# Patient Record
Sex: FEMALE | Race: WHITE
[De-identification: names, ages, dates, MRNs, and addresses within clinical notes are randomized per-mention and may not be internally consistent; named-entity substitution may affect disease eponyms.]

---

## 2018-10-09 LAB
ANION GAP SERPL CALC-SCNC: 14.2 MMOL/L (ref 8–16)
BASOPHILS # BLD AUTO: 0.1 10*3/UL (ref 0–0.1)
BASOPHILS NFR BLD AUTO: 0.7 % (ref 0–1)
BUN SERPL-MCNC: 23 MG/DL (ref 7–26)
BUN/CREAT SERPL: 26 (ref 6–25)
CALCIUM SERPL-MCNC: 9.6 MG/DL (ref 8.4–10.2)
CHLORIDE SERPL-SCNC: 104 MMOL/L (ref 98–107)
CO2 SERPL-SCNC: 27 MMOL/L (ref 22–29)
DEPRECATED NEUTROPHILS # BLD AUTO: 4.6 10*3/UL (ref 2.1–6.9)
EGFRCR SERPLBLD CKD-EPI 2021: > 60 ML/MIN (ref 60–?)
EOSINOPHIL # BLD AUTO: 0.1 10*3/UL (ref 0–0.4)
EOSINOPHIL NFR BLD AUTO: 2 % (ref 0–6)
ERYTHROCYTE [DISTWIDTH] IN CORD BLOOD: 13 % (ref 11.7–14.4)
GLUCOSE SERPLBLD-MCNC: 95 MG/DL (ref 74–118)
HCT VFR BLD AUTO: 40 % (ref 34.2–44.1)
HGB BLD-MCNC: 12.9 G/DL (ref 12–16)
LYMPHOCYTES # BLD: 1.6 10*3/UL (ref 1–3.2)
LYMPHOCYTES NFR BLD AUTO: 23 % (ref 18–39.1)
MCH RBC QN AUTO: 31.9 PG (ref 28–32)
MCHC RBC AUTO-ENTMCNC: 32.3 G/DL (ref 31–35)
MCV RBC AUTO: 99 FL (ref 81–99)
MONOCYTES # BLD AUTO: 0.6 10*3/UL (ref 0.2–0.8)
MONOCYTES NFR BLD AUTO: 8.3 % (ref 4.4–11.3)
NEUTS SEG NFR BLD AUTO: 65.6 % (ref 38.7–80)
PLATELET # BLD AUTO: 187 X10E3/UL (ref 140–360)
POTASSIUM SERPL-SCNC: 4.2 MMOL/L (ref 3.5–5.1)
RBC # BLD AUTO: 4.04 X10E6/UL (ref 3.6–5.1)
SODIUM SERPL-SCNC: 141 MMOL/L (ref 136–145)

## 2018-10-09 NOTE — DIAGNOSTIC IMAGING REPORT
EXAMINATION:  CHEST 2 VIEWS    



INDICATION:      

\S\PER PROTOCOL

\S\PRE ADMIT    



COMPARISON:  None

     

FINDINGS:  PA and lateral views



TUBES and LINES:  None.



LUNGS:  Lungs are well inflated.  Lungs are clear.   There is no evidence of

pneumonia or pulmonary edema.



PLEURA:  No pleural effusion or pneumothorax.



HEART AND MEDIASTINUM:  The cardiomediastinal silhouette is unremarkable. Mild

atherosclerotic calcifications of the aortic arch.



BONES AND SOFT TISSUES:  Cervical spine fusion. Metallic hardware overlying the

right humeral head. Mild degenerative changes of the thoracic spine.  Surgical

clips overlying the upper abdomen on lateral view.



UPPER ABDOMEN: No free air under the diaphragm. 



IMPRESSION: 

No acute thoracic abnormality.





Signed by: Dr. Celine Roberson M.D. on 10/9/2018 4:15 PM

## 2018-10-10 ENCOUNTER — HOSPITAL ENCOUNTER (OUTPATIENT)
Dept: HOSPITAL 88 - OR | Age: 78
Discharge: HOME | End: 2018-10-10
Attending: UROLOGY
Payer: MEDICARE

## 2018-10-10 VITALS — DIASTOLIC BLOOD PRESSURE: 72 MMHG | SYSTOLIC BLOOD PRESSURE: 122 MMHG

## 2018-10-10 DIAGNOSIS — N18.9: ICD-10-CM

## 2018-10-10 DIAGNOSIS — Z87.442: ICD-10-CM

## 2018-10-10 DIAGNOSIS — R35.1: ICD-10-CM

## 2018-10-10 DIAGNOSIS — N31.9: ICD-10-CM

## 2018-10-10 DIAGNOSIS — Z79.82: ICD-10-CM

## 2018-10-10 DIAGNOSIS — I12.9: ICD-10-CM

## 2018-10-10 DIAGNOSIS — N81.10: ICD-10-CM

## 2018-10-10 DIAGNOSIS — K21.9: ICD-10-CM

## 2018-10-10 DIAGNOSIS — Z01.818: ICD-10-CM

## 2018-10-10 DIAGNOSIS — N30.10: ICD-10-CM

## 2018-10-10 DIAGNOSIS — N13.30: ICD-10-CM

## 2018-10-10 DIAGNOSIS — Z91.048: ICD-10-CM

## 2018-10-10 DIAGNOSIS — N95.2: ICD-10-CM

## 2018-10-10 DIAGNOSIS — F32.9: ICD-10-CM

## 2018-10-10 DIAGNOSIS — N28.83: ICD-10-CM

## 2018-10-10 DIAGNOSIS — Z01.812: ICD-10-CM

## 2018-10-10 DIAGNOSIS — N81.6: ICD-10-CM

## 2018-10-10 DIAGNOSIS — Z88.1: ICD-10-CM

## 2018-10-10 DIAGNOSIS — Z85.3: ICD-10-CM

## 2018-10-10 DIAGNOSIS — N39.46: Primary | ICD-10-CM

## 2018-10-10 DIAGNOSIS — Z01.810: ICD-10-CM

## 2018-10-10 PROCEDURE — 93005 ELECTROCARDIOGRAM TRACING: CPT

## 2018-10-10 PROCEDURE — 36415 COLL VENOUS BLD VENIPUNCTURE: CPT

## 2018-10-10 PROCEDURE — 85025 COMPLETE CBC W/AUTO DIFF WBC: CPT

## 2018-10-10 PROCEDURE — 52287 CYSTOSCOPY CHEMODENERVATION: CPT

## 2018-10-10 PROCEDURE — 52005 CYSTO W/URTRL CATHJ: CPT

## 2018-10-10 PROCEDURE — 71046 X-RAY EXAM CHEST 2 VIEWS: CPT

## 2018-10-10 PROCEDURE — 80048 BASIC METABOLIC PNL TOTAL CA: CPT

## 2018-10-10 PROCEDURE — 74420 UROGRAPHY RTRGR +-KUB: CPT

## 2018-10-16 NOTE — XMS REPORT
Summary of Care: 14 - 14

                             Created on: 2069



VERONICA LEAVITT

External Reference #: [id number]

: 1940

Sex: Female



Demographics







                          Address                   [address] [place]  [address]-

 

                          Home Phone                [phone]

 

                          Preferred Language        English

 

                          Marital Status            

 

                          Mormonism Affiliation     None

 

                          Race                      White/

 

                          Ethnic Group              Other





Author







                          Organization              Unknown

 

                          Address                   Unknown

 

                          Phone                     Unavailable







Encounter





HQ Encntr_kaylan(FIN) [id number] Date(s): 14 - 14

[facility] [address] [address]

Discharge Disposition: Home

Physician Attending: Michael Elaine MD

Physician_Referring: Michael Elaine MD





Reason for Visit





793.30



Problem List







    



              Condition     Effective Dates     Status       Health Status     Informant

 

    



                           Chest                     Active  



                                         pain(Confirmed)    

 

    



                           HLD -                     Active  



                                         Hyperlipidemia(Confi    



                                         rmed)    

 

    



                           HTN                       Resolved  



                                         (hypertension)(Confi    



                                         rmed)    

 

    



                           HTN -                     Active  



                                         Hypertension(Confirm    



                                         ed)    

 

    



                           Hypercholesterolemia      Resolved  



                                         (Confirmed)    

 

    



                           Implant(Confirmed)1       Active  







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



Allergies, Adverse Reactions, Alerts







   



                 Substance       Reaction        Severity        Status

 

   



                           cipro                     Active

 

   



                           Tape                      Active







Medications





No data available for this section



Medications Administered During Your Visit





No data available for this section



Immunizations







  



                     Vaccine             Date                Refusal Reason

 

  



                           pneumococcal 23-valent vaccine     11

## 2018-10-16 NOTE — XMS REPORT
Summary of Care: 16 - 16

                             Created on: 2053



VERONICA LEAVITT

External Reference #: [id number]

: 1940

Sex: Female



Demographics







                          Address                   [address] [place]  [address]-

 

                          Home Phone                [phone]

 

                          Preferred Language        English

 

                          Marital Status            

 

                          Anabaptist Affiliation     None

 

                          Race                      White/

 

                          Ethnic Group              Unknown





Author







                          Author                    [facility] [place]

 

                          Address                   Unknown

 

                          Phone                     Unavailable







Encounter





HQ Shirar_kaylan(FIN) [id number] Date(s): 16 - 16

[facility] [place] [address] [place] [address]- [phone]

Discharge Disposition: Home

Attending Physician: Michael Elaine MD





Vital Signs





No data available for this section



Problem List







    



              Condition     Effective Dates     Status       Health Status     Informant

 

    



                           Chest                     Active  



                                         pain(Confirmed)    

 

    



                           HLD -                     Active  



                                         Hyperlipidemia(Confi    



                                         rmed)    

 

    



                           HTN                       Resolved  



                                         (hypertension)(Confi    



                                         rmed)    

 

    



                           HTN -                     Active  



                                         Hypertension(Confirm    



                                         ed)    

 

    



                           Hypercholesterolemia      Resolved  



                                         (Confirmed)    

 

    



                           Implant(Confirmed)1       Active  







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



Allergies, Adverse Reactions, Alerts







   



                 Substance       Reaction        Severity        Status

 

   



                           cipro                     Active

 

   



                           Tape                      Active







Medications





No data available for this section



Results





No data available for this section



Immunizations





Given and Recorded





   



                 Vaccine         Date            Status          Refusal Reason

 

   



                     pneumococcal 23-valent vaccine     11             Given 







Procedures







   



                 Procedure       Date            Related Diagnosis     Body Site

 

   



                                         Abdominal hysterectomy   

 

   



                                         Appendectomy   

 

   



                                         Cataract surgery1   

 

   



                                         Cholecystectomy   

 

   



                                         Kidney biopsy2   

 

   



                                         Replacement of electronic stimulator into   



                                         bladder3   

 

   



                                         Suspension of bladder4   

 

   



                                         Tonsillectomy   







1bilat



2removal of kidney stones



3Placed in left hip



4x3



Social History





No data available for this section



Assessment and Plan





No data available for this section

## 2018-10-16 NOTE — XMS REPORT
Summary of Care: 3/31/16 - 3/31/16

                             Created on: 2128



VERONICA LEAVITT

External Reference #: [id number]

: 1940

Sex: Female



Demographics







                          Address                   [address] [place]  [address]-

 

                          Home Phone                [phone]

 

                          Preferred Language        English

 

                          Marital Status            

 

                          Catholic Affiliation     None

 

                          Race                      White/

 

                          Ethnic Group              Unknown





Author







                          Author                    [facility] [place]

 

                          Address                   Unknown

 

                          Phone                     Unavailable







Encounter





HQ Carl(FIN) [id number] Date(s): 3/31/16 - 3/31/16

[facility] [place] [address] [place], [place] [address]- [phone]

Discharge Disposition: Home

Attending Physician: Shukri Hyde MD





Vital Signs





No data available for this section



Problem List







    



              Condition     Effective Dates     Status       Health Status     Informant

 

    



                           Chest                     Active  



                                         pain(Confirmed)    

 

    



                           HLD -                     Active  



                                         Hyperlipidemia(Confi    



                                         rmed)    

 

    



                           HTN                       Resolved  



                                         (hypertension)(Confi    



                                         rmed)    

 

    



                           HTN -                     Active  



                                         Hypertension(Confirm    



                                         ed)    

 

    



                           Hypercholesterolemia      Resolved  



                                         (Confirmed)    

 

    



                           Implant(Confirmed)1       Active  







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



Allergies, Adverse Reactions, Alerts







   



                 Substance       Reaction        Severity        Status

 

   



                           cipro                     Active

 

   



                           Tape                      Active







Medications





No data available for this section



Results





No data available for this section



Immunizations







  



                     Vaccine             Date                Refusal Reason

 

  



                           pneumococcal 23-valent vaccine     11 







Procedures







   



                 Procedure       Date            Related Diagnosis     Body Site

 

   



                                         Abdominal hysterectomy   

 

   



                                         Appendectomy   

 

   



                                         Cataract surgery1   

 

   



                                         Cholecystectomy   

 

   



                                         Kidney biopsy2   

 

   



                                         Replacement of electronic stimulator into   



                                         bladder3   

 

   



                                         Suspension of bladder4   

 

   



                                         Tonsillectomy   







1bilat



2removal of kidney stones



3Placed in left hip



4x3



Social History





No data available for this section



Assessment and Plan





No data available for this section

## 2018-10-16 NOTE — XMS REPORT
Summary of Care: 18 - 18

                             Created on: 2100



TREE VERONICAPATRICA MACHUCA

External Reference #: [id number]

: 1940

Sex: Female



Demographics







                          Address                   [address] [place]  [address]-

 

                          Home Phone                [phone]

 

                          Preferred Language        English

 

                          Marital Status            

 

                          Islam Affiliation     None

 

                          Race                      White

 

                                        Additional Race(s)  

 

                          Ethnic Group              Non-





Author







                          Author                    [facility] [place]

 

                          Address                   Unknown

 

                          Phone                     Unavailable







Encounter





HQ Encntr_alias(FIN) [id number] Date(s): 18 - 18

[facility] [place] [address] [facility] [address] [place], [place] [address]- [phone]

Encounter Diagnosis

Encounter for screening mammogram for malignant neoplasm of breast (Final) - 
18

Discharge Disposition: Home or Self Care

Attending Physician: Michael Elaine MD





Vital Signs





No data available for this section



Problem List







    



              Condition     Effective Dates     Status       Health Status     Informant

 

    



                           Chest                     Active  



                                         pain(Confirmed)    

 

    



                           HLD -                     Active  



                                         Hyperlipidemia(Confi    



                                         rmed)    

 

    



                           HTN                       Resolved  



                                         (hypertension)(Confi    



                                         rmed)    

 

    



                           HTN -                     Active  



                                         Hypertension(Confirm    



                                         ed)    

 

    



                           Hypercholesterolemia      Resolved  



                                         (Confirmed)    







Allergies, Adverse Reactions, Alerts







   



                 Substance       Reaction        Severity        Status

 

   



                           cipro                     Active

 

   



                           Tape                      Active







Medications





No data available for this section



Results





No data available for this section



Immunizations





Given and Recorded





   



                 Vaccine         Date            Status          Refusal Reason

 

   



                     pneumococcal 23-valent vaccine     11             Given 







Procedures







    



              Procedure     Date         Related Diagnosis     Body Site     Status

 

    



                           Abdominal hysterectomy        Completed

 

    



                           Appendectomy              Completed

 

    



                           Cataract surgery1         Completed

 

    



                           Cholecystectomy           Completed

 

    



                           Kidney biopsy2            Completed

 

    



                           Replacement of electronic stimulator into        Completed



                                         bladder3    

 

    



                           Suspension of bladder4        Completed

 

    



                           Tonsillectomy             Completed







1bilat



2removal of kidney stones



3Placed in left hip



4x3



Social History





No data available for this section



Assessment and Plan





No data available for this section

## 2018-10-16 NOTE — XMS REPORT
Summary of Care: 14 - 14

                             Created on: 2082



VERONICA LEAVITT

External Reference #: [id number]

: 1940

Sex: Female



Demographics







                          Address                   [address] [place]  [address]

 

                          Home Phone                [phone]

 

                          Preferred Language        English

 

                          Marital Status            

 

                          Zoroastrianism Affiliation     None

 

                          Race                      White/

 

                          Ethnic Group              Other





Author







                          Organization              Unknown

 

                          Address                   Unknown

 

                          Phone                     Unavailable







Encounter





HQ Sanjiv_kaylan(FIN) [id number] Date(s): 14 - 14

[facility] [place] [address] [address], [place] [address] [phone]

Discharge Disposition: Home

Physician Attending: Michael Elaine MD





Reason for Visit





719.41 - JOINT PAIN-SHLD



Problem List







    



              Condition     Effective Dates     Status       Health Status     Informant

 

    



                           Chest                     Active  



                                         pain(Confirmed)    

 

    



                           HLD -                     Active  



                                         Hyperlipidemia(Confi    



                                         rmed)    

 

    



                           HTN                       Resolved  



                                         (hypertension)(Confi    



                                         rmed)    

 

    



                           HTN -                     Active  



                                         Hypertension(Confirm    



                                         ed)    

 

    



                           Hypercholesterolemia      Resolved  



                                         (Confirmed)    

 

    



                           Implant(Confirmed)1       Active  







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



Allergies, Adverse Reactions, Alerts







   



                 Substance       Reaction        Severity        Status

 

   



                           cipro                     Active

 

   



                           Tape                      Active







Medications





No data available for this section



Medications Administered During Your Visit





No data available for this section



Immunizations







  



                     Vaccine             Date                Refusal Reason

 

  



                           pneumococcal 23-valent vaccine     11

## 2018-10-16 NOTE — XMS REPORT
Continuity of Care Document

                             Created on: 10/11/2018



VERONICA LEAVITT

External Reference #: [id number]

: 1940

Sex: Female



Demographics







                          Address                   [address] [place]  [address]

 

                          Home Phone                [phone]

 

                          Preferred Language        Unknown

 

                          Marital Status            Unknown

 

                          Denominational Affiliation     Unknown

 

                          Race                      Unknown

 

                          Ethnic Group              Unknown





Author







                          Author                    [facility]              Interface

 

                          Address                   Unknown

 

                          Phone                     Unavailable



                                                    



Problems

                    





                    Problem                            Status                            Onset Date     

                          Classification                            Date Reported       

                          Comments                            Source                    

 

                    Unspecified abdominal pain                                                        2018                   

                                                       [facility]                    

 

                                        Encounter for screening mammogram for malignant neoplasm of breast              

                                                2018                                                        

 AB[facility] Cosme                    

 

                          Postlaminectomy syndrome, not elsewhere classified                                

                          2018                                                        [facility]

 [facility]                    

 

                    CERVICAL PAIN                            Active                            06/15/2017

                                                                                       

                                        [facility]                    

 

                          S/P RT SHOULDER RTC REPAIR                            Active                      

                    2016                                                                       

                                                      [facility]                    

 

                    S/P REVERSE RTC REPAIR                            Active                            

2016                                                                           

                                                      [facility]                    

 

                          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        

                    2016                                                         

                                                       [facility]                  

  

 

                          M79.645 - PAIN IN LEFT FINGER(S)                            Active                

                    [facility]                                                                 

                                                      [facility]                   

 

 

                    174.3                            Active                            2015       

                                                                                       

                                        [facility]                    

 

                    174.3 BREAST CANCER                            Active                            2015

                                                                                       

                                        [facility]                    

 

                    BREAST MASS                            Active                            2014 

                                                                                       

                                        [facility]                    

 

                    793.80                            Active                            2014      

                                                                                       

                                        [facility]                    

 

                    Discharge Diagnosis: Fall                                                        2014                   

                                                      [facility]                    

 

                          Discharge Diagnosis: Neck pain                                                    

                    2014       

                                                      [facility]                    

 

                    FALL                            Active                            2014        

                                                                                       

                                        [facility]                    

 

                    Chest pain                            Active                                        

                          Problem                            2018                        

                                                       AB[facility] Cosme,[facility], [facility] [place],[facility]                    

 

                    HLD - Hyperlipidemia                            Active                              

                          Problem                            2018              

                                                       [facility] Cos[facility] [place]

 [place],[facility]                    

 

                          HTN (<span ID="[id number]">Confirmed</span>)                            Resolved 

                                                       Problem                       

                    10/09/2015                                                         AB[facility] Cosme,[facility], [facility] [place]                    

 

                    HTN - Hypertension                            Active                                

                          Problem                            2018                

                                                       ABEL Cosme,[facility] [place]

 [place]                    

 

                    Hypercholesterolemia                            Resolved                            

                            Problem                            2018            

                                                       ABEL Cosme,[facility], [facility] 

[place],[facility]                    

 

                          HTN (<span ID="[id number]">Confirmed</span>)                            Resolved 

                                                       Problem                       

                    2018                                                         AB[facility] Cosme,[facility]                    

 

                    Calculus of kidney                                                                  

                                                2018                               

                                         [facility]                    

 

                          Other specified disorders of kidney and ureter                                    

                                                                            2018 

                                                        [facility]           

         

 

                    Cyst of kidney, acquired                                                            

                                                      2018                       

                                                       [facility]                    

 

                    Implant<sup>1</sup>                            Active                               

                          Problem                            2017               

                                        Kidney and bladder device that is implanted in the Left hip           

                                         ABEL Cosme,[facility], [facility] [place],[facility]

 [facility]                    

 

                    Cervicalgia                                                                         

                                                2018                                      

                                        [facility]                    

 

                    Weakness                                                                            

                                                2018                                         

                                        [facility]                    

 

                          Radiculopathy, cervical region                                                    

                                                                            2018                 

                                                      [facility]                    

 

                          MAL TAMMI BREAST LOW-INNER                            Active                        

                                                                                                     

                                        [facility]                    

 

                          MALIG NEOPLASM OF LOWER-INNER QUADRANT O                            Active        

                                                                                       

                                                      [facility]                    

 

                          OTH ABN AND INCONCLUSIVE FINDINGS ON DX                             Active        

                                                                                       

                                                      [facility]                    



                                                                                
                                                                                
                                                                                
                                                                                
                                                                                
                                                                                
                                                                                
 



Medications

                    





                    Medication                            Details                            Route      

                          Status                            Patient Instructions         

                          Ordering Provider                            Order Date           

                                        Source                    

 

                                        Cyclobenzaprine hydrochloride 10 MG Oral Tablet [Flexeril]                      

                                        10 mg=1 tab, PO, TID, for spasm, # 30 tab, 0 Refill(s)                       

                                                Active                                                

                                                09/15/2014                            [facility] 

                   

 

                          Flexeril                            10 mg, Route: PO, ONCE, Dosing Weight 81.818, 

kg, Priority: STAT, Start date: 14 19:43:00, Stop date: 14 19:43:00 
                                                       Inactive                      

                                                                            09/15/2014               

                                        [facility]                    



                                                                                
                        



Allergies, Adverse Reactions, Alerts

                    





                    Substance                            Category                            Reaction   

                          Severity                            Reaction type           

                          Status                            Date Reported                     

                          Comments                            Source                    

 

                    cipro                            Assertion                                          

                                                Drug allergy                            Active

                                                                                     

[facility] [place]                    

 

                    Tape                            Assertion                                           

                                                      Propensity to adverse reactions to substance

                            Active                                                   

                                                       [facility] [place]                    



                                                                                



Immunizations

                    





                    Immunization                            Date Given                            Site  

                          Status                            Last Updated             

                          Comments                            Source                    

 

                          pneumococcal 23-valent vaccine                            2011              

                          Left deltoid                            completed                      

                    Juliano                                                         ABEL Cosme,[facility], [facility] [place],[facility]                    



                                                                                
        



Results

                    





                    Order Name                            Results                            Value      

                          Reference Range                            Date                

                          Interpretation                            Comments                       

                                        Source                    

 

                          Abdomen/Pelvis wo IV contrast CT                            Abdomen/Pelvis wo IV contrast

 CT                                     Clinical Indication: N20.0 - CALCULUS OF KIDNEY.    



Comparison: CT abdomen pelvis 2008, 2007



TECHNIQUE: Helical imaging was performed from diaphragm through the symphysis 
with multiplanar reformations obtained.  

IV CONTRAST: None

GI CONTRAST: None



CT imaging performed at this location utilizes radiation dose optimization 
techniques which include one or more of the following:

                                        -Automated exposure control

                                        -Adjustment of the mA and/or kV according to patient size

                                        -Use of iterative reconstruction technique

CT Radiation Dose  mGy-cm



FINDINGS: 

LUNG BASES: There are coronary artery calcifications.



HEPATOBILIARY: The liver is hypodense.  The gallbladder is surgically absent. 



SPLEEN: The spleen is normal in size. Peripherally calcified 1 cm splenic artery
 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



PANCREAS: The pancreas has a grossly normal noncontrast appearance. 



ADRENAL GLANDS: The adrenal glands are normal. 



KIDNEYS: There are punctate 2 mm nonobstructing renal calyceal calculi 
bilaterally. A 3.1 cm cyst is present at the mid to lower pole of the right 
kidney. There is mild right pelvocaliectasis to the level of the ureteropelvic 
junction. No obstructing stone. No perinephric collections. There is bandlike 
scarring in the pararenal fat at the region of the lower pole of the right 
kidney which is similar to multiple prior exams.



BOWEL: Evaluation of the bowel is limited without intravenous or oral contrast. 
There is a small sliding hiatal hernia. The stomach is unremarkable. The bowel 
is normal in caliber.  The appendix is not seen.  Colonic diverticulosis without
 acute inflammatory change.



RETROPERITONEUM/PERITONEUM: There is no free fluid. No free air. 



LYMPH NODES: No adenopathy.  



VASCULATURE: Atheromatous changes are present within the aorta without aneurysm.



PELVIS: There is a small amount of gas within the bladder lumen. There are 
calcifications within the bladder lumen measuring up to 5 mm. The uterus is 
surgically absent.



MUSCULOSKELETAL: There are postsurgical changes of lumbosacral spinal fusion. 
Abandoned sacral stimulator lead is seen on the left.    



IMPRESSION: 

                                        1.  Mild right pelvocaliectasis to the level of the ureteropelvic junction. No obstructing

 stone. There is some scarring in the pararenal fat on the right which may 
involve the UPJ. This is similar to prior exams dating back to .

                                        2.  Bilateral nonobstructing nephrolithiasis

                                        3.  Bladder calculi

                                        4.  Trace gas within the bladder lumen may be related to recent instrumentation.

 Correlate with urinalysis to exclude infection

                                        5.  Right renal cyst

                                        6.  Hepatic steatosis

                                        7.  Diverticulosis



SL:  [id number]



                                                          10/11/2018                 

                                                      -

                                        -





Read by:  Elham Rodriguez MD

Dictated Date/time:  10/11/18 17:41

Electronically Signed by:  Elham Rodriguez MD               10/11/18 
17:51

FINAL REPORT

                                         [facility] [place]                    

 

                          Shoulder wo contrast MRI                            Shoulder wo contrast MRI      

                                        MR LEFT SHOULDER WITHOUT CONTRAST



HISTORY:  - M75.112   Incomplete rotator cuff tear or rupture of left shoulder, 
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 
78-year-old female reports left shoulder pain and limited range of motion for 
several months



COMPARISON: Left shoulder radiography dated 2018



TECHNIQUE: Axial, oblique coronal, and oblique sagittal MR images of the 
shoulder. 



FINDINGS: 



ROTATOR CUFF:



                                        1. Mild thickening and moderate abnormal signal throughout the supraspinatus insertion

 compatible moderate tendinopathy. No discrete supraspinatus tear.

                                        2. Normal infraspinatus.

                                        3. Normal subscapularis.

                                        4. No rotator cuff muscle atrophy.

                                        5. High riding humeral head which appears secondary to bulky osteophytes of the 

inferior aspect of the glenohumeral joint resulting in impingement of the 
acromial undersurface on the supraspinatus bursal surface fibers.



LABRUM AND BICEPS TENDON:



                                        6. Multifocal degenerative labral tearing.

                                        7. The biceps tendon is intact and located normally within the bicipital groove.





OSSEOUS/ARTICULAR:



                                        8. Mild hypertrophic acromioclavicular arthropathy with mild inferior spurring.

                                        9. Os acromiale is noted, a finding which can be associated with external shoulder

 impingement.

                                        10. Extensive chronic full-thickness cartilage loss of the humeral head and glenoid

 articular surfaces with bulky degenerative spurring at the inferior aspect of 
the glenohumeral joint and subchondral cystic change within the inferior 
glenoid.

                                        11. No fracture, bone contusion, or aggressive osseous lesion.



IMPRESSION:



                                        1. Moderate supraspinatus insertional tendinopathy without a tear.



                                        2. Severe glenohumeral degenerative arthrosis demonstrating extensive chronic full-

thickness cartilage loss and bulky degenerative spurring at the inferior aspect 
of the glenohumeral joint and subchondral cystic change within the inferior 
glenoid.



                                        3. High riding humeral head which appears secondary to bulky osteophytes of the 

inferior aspect of the glenohumeral joint resulting in impingement of the 
acromial undersurface on the supraspinatus bursal surface fibers.



                                        4. Os acromiale is noted, a finding which can be associated with external shoulder

 impingement.



                                        5. Mild hypertrophic acromioclavicular arthropathy with mild inferior spurring.



                                        6. Multifocal degenerative tearing of the labrum.











Thank you referring your patient to [facility] and [facility].













SL: [id number]



                                                          10/11/2018                 

                                                      -

                                        -





Read by:  Davin Encarnacion MD

Dictated Date/time:  10/11/18 13:26

Electronically Signed by:  Davin Encarnacion MD                 10/11/18 
13:35

FINAL REPORT

                                         [facility]                    

 

                          Knee 3 views DX                            Knee 3 views DX                        

                                        Study: Left knee, 3 views 



Clinical Indication: M25.562 - ACUTE PAIN



Comparison: None



FINDINGS: Multiple views of the left knee show moderate medial femorotibial 
compartment osteoarthrosis with joint space narrowing and marginal osteophyte 
formation. No acute bony fracture or joint dislocation is seen. No joint 
effusion is noted. Soft tissues are unremarkable.



IMPRESSION: Moderate medial femorotibial compartment osteoarthrosis of the left 
knee.





SL:  [id number]



                                                          2018                 

                                                      -

                                        -





Read by:  Tacos Garrido MD

Dictated Date/time:  18 16:42

Electronically Signed by:  Tacos Garrido MD                18 
16:43

FINAL REPORT

                                         [facility]                    

 

                          Shoulder series DX                            Shoulder series DX                  

                                        Study: Left shoulder, 3 views 



Clinical Indication: M25.512 - ACUTE PAIN



Comparison: None



FINDINGS: Multiple views of the left shoulder show severe glenohumeral joint 
osteoarthrosis with joint space narrowing and prominent marginal osteophyte 
formation. No acute bony fracture or joint dislocation is seen. Mild AC joint 
osteoarthrosis is noted. Fusion hardware in the lower cervical spine is seen. 
Soft tissues are unremarkable.



IMPRESSION: Severe glenohumeral joint osteoarthrosis.





SL:  [id number]



                                                          2018                 

                                                      -

                                        -





Read by:  Tacos Garrido MD

Dictated Date/time:  18 16:41

Electronically Signed by:  Tacos Garrido MD                18 
16:42

FINAL REPORT

                                         [facility]                    

 

                          Retroperitoneal Complete US                            Retroperitoneal Complete US

                                        EXAM: US RETROPERITONEAL



HISTORY: 77 years year-old Female with N20.0   Calculus of kidney; R10.9   
Unspecified abdominal pain



COMPARISON: US Retroperitoneal 2015



TECHNIQUE:

Multiple longitudinal and transverse real time sonographic images of the kidneys
 and urinary bladder are obtained.



FINDINGS: 



Right kidney:

Size:  10.3 cm.

The kidney is normal in size, shape, contour, and position.  The cortex is 
normal in thickness and the corticomedullary differentiation is maintained. 
Stable mild right pelvocaliectasis. Echogenic focus measuring 4 mm noted in the 
right kidney likely representing a nonobstructing stone. Septated cyst noted in 
inferior pole measuring approximately 2.7 x 2.7 x 2.6 cm, previously 2.7 x 2.2 x
 2.2 cm.



Left kidney:

Size:  10.1 cm.

The kidney is normal in size, shape, contour, and position.  The cortex is 
normal in thickness and the corticomedullary differentiation is maintained. Mild
 left pelviectasis, stable. Cyst noted in the inferior pole measuring 
approximately 1.5 x 1.1 x 1.5 cm.



Bladder:

Partial distension of the urinary bladder with anechoic fluid is noted.  No wall
 thickening or mass is seen. Bilateral bladder jets noted.



Aorta and Inferior Vena Cava:

The visualized abdominal aorta is unremarkable. The iliac bifurcation was not 
well seen due to bowel gas. The intrahepatic IVC is patent.





IMPRESSION:

                                        1. Stable mild bilateral pelviectasis, possibly representing extrarenal pelvises.



                                        2. Bilateral renal cysts.

                                        3. Nonobstructing right intrarenal stone.





SL:  [id number]



                                                          2018                 

                                                      -

                                        -





Read by:  Concepcion Amaro MD

Dictated Date/time:  18 10:01

Electronically Signed by:  Concepcion Amaro MD                       18 
10:08

FINAL REPORT

                                        [facility]                    

 

                          Breast Mammo Scrn ALEX incl CAD MA                            Breast Mammo Scrn ALEX

 incl CAD MA                         





BILATERAL DIGITAL SCREENING MAMMOGRAM WITH CAD: 2018





CLINICAL: /Routine.  





Current study was evaluated with a Computer Aided Detection (CAD) system.  



COMPARISON:Comparison is made to exams dated:  2016 mammogram, 8/3/2015 
mammogram, 2014 mammogram - [facility], and 10/17/2014 
mammogram - [facility].   



TECHNIQUE: Mammographic views were obtained using digital acquisition. Current 
study was also evaluated with a Computer Aided Detection (CAD) system. 



FINDINGS: 

There are scattered fibroglandular densities in both breasts.  



There are benign calcifications in both breasts.  There also are post operative 
findings in the right breast.  

No significant masses, calcifications, or other findings are seen in either 
breast.  

There has been no significant interval change.





IMPRESSION: BENIGN



RECOMMENDATION:There is no mammographic evidence of malignancy. A 1 year 
screening mammogram is recommended.(2019)   This exam was interpreted at 
[id number] for TERESA Moore 15.  



Professional services are provided by the [facility].



Rupert Narvaez M.D., cm/maria de jesus:2018 11:54:37  



Imaging Technologist(s): RT Ally(R)(M), [facility]

letter sent: BI-RADS 1/2  

Mammogram BI-RADS: 2 Benign

                                                          2018                 

                                                      -

                                        -





Read by:  Mark Marmolejo MD

Dictated Date/time:  18 11:54

Electronically Signed by:  Mark Marmolejo MD                      18 
11:54

FINAL REPORT

                                        [facility]                    

 

                          Ankle 3 views DX                            Ankle 3 views DX                      

                                        REASON FOR EXAM: M25.572, M25.472 - PAIN, SWELLING.



COMPARISON: None.



FINDINGS: 3 views of the left ankle. There is mild bimalleolar soft tissue 
swelling. The ankle mortise appears intact. There is a small plantar calcaneal 
enthesophyte. There are small bony spurs at the dorsal aspect of the medial 
cuneiform. There is no demonstrable fracture, dislocation or radiopaque foreign 
body.



IMPRESSION: 

                                        1. Mild bimalleolar soft tissue swelling.

                                        2. No demonstrable acute osseous abnormality of the left ankle.





SL: 16



                                                          2018                 

                                                      -

                                        -





Read by:  Leonardo Barragan MD

Dictated Date/time:  18 15:16

Electronically Signed by:  Leonardo Barragan MD                     18 
15:25

FINAL REPORT

                                        [facility]                    

 

                          Spine cervical 2 or 3 view DX                            Spine cervical 2 or 3 view

 DX                                     REASON FOR EXAM: M54.2. Neck pain.



COMPARISON: Cervical spine series 3/31/2016.



FINDINGS: AP, lateral, swimmer's and open-mouth views of the cervical spine were
 performed. 4 images are submitted. There are postsurgical changes of anterior 
fusion from C5 through C7. There is no demonstrable hardware malfunction. There 
is mature bony fusion of the vertebral endplates at C5-C6 and C6-C7.



There is straightening of the normal cervical lordosis. There is approximately 1
 to 2 mm anterolisthesis of C2 on C3, 1 to 2 mm anterolisthesis of C3 on C4, 1 
to 2 mm retrolisthesis of C4 on C5 and 3 to 4 mm anterolisthesis of C7 on T1.



There are mild degenerative changes of the atlantodens interval. There are small
 anterior marginal osteophytes at C3 and C4. There is diffuse disc space 
narrowing at C3-C4, C4-C5 and C7-T1. There is uncal vertebral joint hypertrophy 
at C3-C4 and C4-C5. There is multilevel facet arthropathy.



There is no demonstrable fracture, dislocation or prevertebral soft tissue 
swelling.



IMPRESSION: 

                                        1. No significant interval change from 3/31/2016.

                                        2. Status post anterior fusion from C5 through C7.

                                        3. Degenerative changes and multilevel grade 1 listhesis of the cervical spine as

 described above.





SL: 16



                                                          2017                 

                                                      -

                                        -





Read by:  Leonardo Barragan MD

Dictated Date/time:  17 13:06

Electronically Signed by:  Leonardo Barragan MD                     17 
13:22

FINAL REPORT

                                        [facility]                    

 

                          Digital Mammo DX Alex MA                            Digital Mammo DX Alex MA        

                                         - DIGITAL MAMMO DX ALEX MA

BILATERAL DIGITAL DIAGNOSTIC MAMMOGRAM WITH CAD: 2016

CLINICAL: Fibrocystic Disease

right IDC s/p lumpectomy and XRT.  



Current study was evaluated with a Computer Aided Detection (CAD) system.  

Comparison is made to exams dated:  8/3/2015 mammogram, 2014 mammogram - 
[facility], 10/17/2014 mammogram - [facility], 2012 mammogram, 2010 mammogram and 2008 mammogram - 
[facility].  

There are scattered fibroglandular densities in both breasts.  

The patient is status post lumpectomy right breast.  There are post operative 
and radiation changes in the right breast. 

There are benign calcifications in both breasts.  

No significant masses, calcifications, or other findings are seen in either 
breast.  

There has been no significant interval change. Breast ultrasound was offered to 
the patient but was declined.  



IMPRESSION: BENIGN



The patient is status post lumpectomy right breast.  



There is no mammographic evidence of malignancy.  A 1 year screening mammogram 
is recommended. The results were reviewed with the patient.  





Laila maciast/:2016 13:50:21  



Imaging Technologist: Miguelina Figueroa, [facility]

This exam was dictated and interpreted by [id number] for [facility].  

letter sent: Normal exam  

Mammogram BI-RADS: 2 Benign

                                                          2016                 

                                                      -

                                        -





Read by:  Laila Fuller MD

Dictated Date/time:  16 13:50

Electronically Signed by:  Laila Fuller MD                           16 
13:50

FINAL REPORT

                                        [facility]                    

 

                          Ankle 2 views DX                            Ankle 2 views DX                      

                                        Study: Right ankle, 3 views 



Clinical Indication: M79.671, M25.571 / RT. FOOT AND ANKLE PAIN



Comparison: None



FINDINGS: Multiple views of the right ankle show no acute bony fracture or joint
 dislocation. Ankle mortise is congruent. Soft tissues are unremarkable.



IMPRESSION: No acute bony abnormality of the right ankle.





SL:  [id number]



                                                          2016                 

                                                      -

                                        -





Read by:  Tacos Garrido MD

Dictated Date/time:  16 09:47

Electronically Signed by:  Tacos Garrido MD                16 
09:47

FINAL REPORT

                                         [facility] [place]                    

 

                          Foot 2 views DX                            Foot 2 views DX                        

                                        Study: Right foot, 2 views 



Clinical Indication: M79.671, M25.571 / RT. FOOT AND ANKLE PAIN



Comparison: None



FINDINGS: 2 views of the right foot show no acute bony fracture, joint 
dislocation, or suspicious osseous lesion. The bones are diffusely 
demineralized. Os navicularis is noted. Mild to moderate midfoot osteoarthrosis 
is seen. Soft tissues are unremarkable.



IMPRESSION:

                                        1. No acute bony abnormality of the right foot.

                                        2. 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





SL:  [id number]



                                                          2016                 

                                                      -

                                        -





Read by:  Tacos Garrido MD

Dictated Date/time:  16 09:47

Electronically Signed by:  Tacos Garrido MD                16 
09:48

FINAL REPORT

                                         [facility] [place]                    

 

                          Shoulder wo contrast MRI                            Shoulder wo contrast MRI      

                                           

EXAM: MRI of the right shoulder without contrast



INDICATION: M75.101   Unspecified rotator cuff tear or rupture of right 
shoulder, not specified as traumatic, right shoulder pain



COMPARISON: Plain films of the right shoulder from 2016



TECHNIQUE: Multiplanar, multisequence magnetic resonance imaging of the right 
shoulder was performed without  the administration of intravenous gadolinium 
contrast. 



FINDINGS: 



Glenohumeral joint: Negative for acute bony fracture or joint dislocation. 
Severe glenohumeral joint osteoarthrosis is seen with joint space narrowing and 
marginal osseous spurring. Large areas of full-thickness cartilage loss 
throughout the anterosuperior glenoid fossa and superior humeral head are seen 
with underlying subchondral cystic changes along the superior humeral head. Mild
 subchondral cystic change along the inferior glenoid is seen. Degenerative tear
 of the superior glenoid labrum is seen. Small to moderate-sized glenohumeral 
joint effusion and synovitis is seen, communicating with the subacromial-
subdeltoid bursa.



Osseous acromion complex: Mesoacromion type os acromiale is seen with trace 
fluid within the synchondrosis. Mild AC joint osteoarthrosis is seen.



Rotator cuff tendons: Mild to moderate infraspinatus tendinosis is seen. There 
is a full-thickness tear of the supraspinatous tendon with associated 
delaminating morphology measuring 1.5 cm AP dimension with 2 cm proximal tendon 
retraction of the torn articular surface fibers and 6 mm proximal tendon 
retraction of the torn bursal surface fibers. Additional bursal surface partial-
thickness tearing of the supraspinatous tendon at the level of the AC joint is 
also seen. Interstitial fluid tracks proximally along the supraspinatus 
myotendinous unit. High-grade articular surface partial-thickness tears of the 
remaining posterior fibers of the supraspinatus tendon are seen. Subscapularis 
and teres minor tendons are intact.



Biceps tendon: Intracapsular biceps tendinosis is seen. No tendon subluxation or
 dislocation is noted. 9 mm loose body within the biceps tendon sheath of the 
level of the proximal humeral metaphysis is seen.



Soft tissues: Moderate atrophy of the supraspinatus muscle belly is seen. Fatty 
infiltration of the infraspinatus muscle belly is also seen.



IMPRESSION:

                                        1. Severe glenohumeral joint osteoarthrosis with small to moderate-sized glenohumeral

 joint effusion and synovitis.

                                        2. Full-thickness tear of the supraspinatus tendon with delaminating morphology 

measuring 1.5 cm AP dimension. There is 2 cm proximal retraction of the torn 
articular surface fibers and 6 mm tendon retraction of the torn bursal surface 
fibers. High-grade articular surface partial-thickness tears of the remaining 
posterior fibers of the supraspinatus tendon are seen. Bursal surface partial-
thickness tearing of the supraspinatus tendon at the level of the AC joint is 
also noted.

                                        3. Mild to moderate infraspinatus tendinosis.

                                        4. Biceps tendinosis with 9 mm loose body within the tendon sheath at the level 

of the proximal humeral metaphysis.

                                        5. Mild AC joint osteoarthrosis with incidentally noted os acromiale.





SL: [id number]



                                                          2016                 

                                                      -

                                        -





Read by:  Tacos Garrido MD

Dictated Date/time:  16 10:51

Electronically Signed by:  Tacos Garrido MD                16 
11:05

FINAL REPORT

                                         [facility] [place]                    

 

                          Shoulder series DX                            Shoulder series DX                  

                                        Patient Name: VERONICA LEAVITT

: 1940; Age: 75 years Female

MR: [id number]



Study: Shoulder series DX 3/31/2016 2:02 PM CDT

Clinical Indication: PAIN IN RIGHT SHOULDER. 



COMPARISON: None



Views and laterality: 2016. 2014.

Examination of the shoulder demonstrates moderate right glenohumeral joint 
osteoarthritic change. Healed right proximal humeral neck fracture. The 
acromioclavicular joint and coracoclavicular spaces are intact. The acromion and
 coracoid processes appear normal.The subacromial space is normal. Stable 6 mm 
cyst in the mid body of the scapula when compared to 2014. The clavicle
 is normal. Lower cervical fusion previously described. Right axillary staples.



If there is further concern, followup radiographs or MRI of the shoulder may be 
performed for complete assessment.





IMPRESSION: 

                                        1.  Right glenohumeral joint osteoarthritic change.

                                        2.  Old healed proximal humeral fracture.

                                        3.  Lower cervical fusion.

                                        4.  No acute process.







SL:  [id number]



                                                          2016                 

                                                      -

                                        -





Read by:  Mikey Barr MD

Dictated Date/time:  16 17:06

Electronically Signed by:  Mikey Barr MD                        16 
17:09

FINAL REPORT

                                        [facility]                    

 

                          Spine cervical series DX                            Spine cervical series DX      

                                        Study: Cervical spine, 5 views 



Clinical Indication: CERVICALGIA



Comparison: None



FINDINGS: Multiple views of the cervical spine show visualization through the C7
 vertebral body on the lateral view. Postoperative changes of ACDF from C5 
through C7 are seen. No hardware fracture or displacement is seen. No suspicious
 perihardware paralleling lucency is seen. There is grade 1 anterolisthesis of 
C7 over T1 by 3 mm. Grade 1 anterolisthesis of C3 over C4 by 2 mm is seen. There
 is also grade 1 retrolisthesis of C4 over C5 by 2 mm. Moderate-severe 
multilevel degenerative disc disease at C3-C4, C4-C5, and C7-T1 is seen with 
disc height loss and marginal osteophytes. Moderate-severe facet arthrosis of 
the cervical spine is seen. Moderate-severe multilevel neural foraminal 
narrowing throughout the cervical spine is seen. Odontoid process is intact. 
Prevertebral soft tissues are unremarkable.



IMPRESSION:

                                        1. Postoperative changes of ACDF from C5 through C7 without hardware complication.



                                        2. Moderate to severe multilevel degenerative changes throughout the cervical spine.







SL:  [id number]



                                                          2016                 

                                                      -

                                        -





Read by:  Tacos Garrido MD

Dictated Date/time:  16 17:30

Electronically Signed by:  Tacos Garrido MD                16 
17:32

FINAL REPORT

                                        [facility]                    

 

                          Shoulder series DX                            Shoulder series DX                  

                                        Study: Right shoulder, 2 views 



Clinical Indication: Right shoulder pain



Comparison: Plain films of the right shoulder from 2014



FINDINGS: Multiple views of the right shoulder show a chronic healed humeral 
surgical neck fracture. No acute bony fracture or joint dislocation is seen. 
Moderate-severe glenohumeral joint osteoarthrosis is again noted. Fusion 
hardware in the lower cervical spine is seen. Right axillary surgical clips are 
seen.



IMPRESSION: Moderate-severe glenohumeral joint osteoarthrosis.





SL:  [id number]



                                                          2016                 

                                                      -

                                        -





Read by:  Tacos Garrido MD

Dictated Date/time:  16 11:19

Electronically Signed by:  Tacos Garrido MD                16 
11:20

FINAL REPORT

                                        [facility]                    

 

                          Finger 3 views DX                            Finger 3 views DX                    

                                        LEFT FINGER RADIOGRAPH 3 VIEW

 

INDICATION: Left thumb pain

 

COMPARISON: Left hand radiograph 10/06/2015

 

IMPRESSION:

 

No acute bony abnormalities are visualized.

 

There is no significant interval change in mild arthrosis of the first CMC and 
MCP joints, characterized by joint space narrowing and mild osteophytosis.

 

 

 

 

SL: 16

                                                          2016                 

                                                      -

                                        -





Read by:  Fabrice Aguila MD

Dictated Date/time:  16 14:41

Electronically Signed by:  Fabrice Aguila MD                        16 
14:43

FINAL REPORT

                                        [facility]                    

 

                          Hand 3 views DX                            Hand 3 views DX                        

                                        Examination: Left hand, 3 views

 

History: PAIN OF LEFT THUMB

 

Comparison: None.

 

Findings: Multiple views of the left hand show no acute bony fracture, joint 
dislocation, or suspicious osseous erosion. The bones are demineralized. There 
is mild osteoarthrosis of the thumb MCP joint with mild joint space narrowing 
and marginal osseous spurring. Severe triscaphe and thumb CMC osteoarthrosis is 
also seen. Soft tissues are unremarkable.

 

IMPRESSION:

                                        1. No acute bony abnormality of the left hand.

                                        2. Severe triscaphe and thumb CMC osteoarthrosis with mild osteoarthrosis of the

 thumb CMC joint.

 

SL:  16

                                                          10/06/2015                 

                                                      -

                                        -





Read by:  Tacos Garrido MD

Dictated Date/time:  10/06/15 08:45

Electronically Signed by:  Tacos Garrido MD                10/06/15 
08:46

FINAL REPORT

                                        [facility]                    

 

                          Breast Complete Alex US                            Breast Complete Alex US          

                                        - BREAST COMPLETE ALEX US

ULTRASOUND OF BOTH BREASTS AND BOTH AXILLA: 8/3/2015

CLINICAL: Pain

h/o right breast CA s/p lumpectomy and XRT.  



Comparison is made to exams dated:  8/3/2015 mammogram, 2014 ultrasound 
biopsy, 2014 ultrasound, 2014 mammogram - [facility], 10/17/2014 mammogram - [facility] and 2012 
mammogram - [facility].  



Color flow and real-time ultrasound of both breasts and both axilla were 
performed.  Gray scale images of the real-time examination were reviewed.   For 
both breasts, all 4 quadrants, the retroareolar region and axilla are evaluated 
in this exam.  



Prior mass right breast at 7 o'clock has been removed.  There is a seroma and a 
lumpectomy cavity right breast at 7 o'clock.  There also is a 1 cm benign 
hyperechoic lipoma left breast at 7 o&apos;clock that is an incidental finding. 
 

No abnormalities were seen sonographically in either axilla.  



IMPRESSION: BENIGN - FOLLOW-UP RECOMMENDED



There is no sonographic evidence of malignancy.  



There is no mammographic or sonographic abnormality seen in the left breast to 
correspond with the nipple abnormality/inversion.  This likely represents 
physiologic laxity of supporting structures and is unchanged since at least 
2014. The patient states this is unchanged since her last exam.



There is no abnormality seen in the right breast to correspond with the pain 
which likely represents a scar and post treatment changes.



A follow-up mammogram in 6 months is recommended to demonstrate stability. The 
results were reviewed with the patient.  



SUMMARY:

A follow-up right diagnostic mammogram with possible ultrasound in 6 months is 
recommended to demonstrate stability given the patient's history of prior 
lumpectomy.  





Laila Fuller M.D.  

jt/:8/3/2015 15:09:03  



Imaging Technologist: Michele Monzon, [facility]

This exam was dictated and interpreted by [id number] for [facility].  

letter sent: Normal exam  

Ultrasound BI-RADS: 2 Benign

                                                          2015                 

                                                      -

                                        -





Read by:  Laila Fuller MD

Dictated Date/time:  08/03/15 15:09

Electronically Signed by:  Laila Fuller MD                           08/03/15 
15:09

FINAL REPORT

                                        [facility]                    

 

                          Digital Mammo DX Alex MA                            Digital Mammo DX Alex MA        

                                         - DIGITAL MAMMO DX ALEX MA

BILATERAL DIGITAL DIAGNOSTIC MAMMOGRAM WITH CAD: 8/3/2015

CLINICAL: Breast Cancer

right IDC s/p lumpectomy and XRT.  



Current study was evaluated with a Computer Aided Detection (CAD) system.  

Comparison is made to exams dated:  2014 mammogram - [facility], 10/17/2014 mammogram - [facility], 2012 
mammogram, 2010 mammogram and [facility].  

There are scattered fibroglandular densities in both breasts.  

The patient is status post lumpectomy right breast with associated post 
operative changes.  

There are benign calcifications in both breasts.  

No significant masses, calcifications, or other findings are seen in either 
breast.  



IMPRESSION: INCOMPLETE: NEEDS ADDITIONAL IMAGING EVALUATION



The patient is status post lumpectomy right breast.  



There is no mammographic abnormality seen in the left breast to correspond with 
the nipple inversion/abnormality, however ultrasound is recommended.  The 
patient has documented prior left nipple inversion in . 



There is no mammographic abnormality seen in the right breast to correspond with
 the pain which likely represents a scar or post-treatment changes, however 
ultrasound is recommended.  





SUMMARY:

Ultrasound will be performed at this time; please see dedicated separate report.
  





Laila Fuller M.D.          

jt/:8/3/2015 15:00:07  



Imaging Technologist: Magda Wen, [facility]

This exam was dictated and interpreted by [id number] for [facility].  



Mammogram BI-RADS: 0 Indeterminate

                                                          2015                 

                                                      -

                                        -





Read by:  Laila Fuller MD

Dictated Date/time:  08/03/15 15:00

Electronically Signed by:  Laila Fuller MD                           08/03/15 
15:00

FINAL REPORT

                                        [facility]                    

 

                          Retroperitoneal limited US                            Retroperitoneal limited US  

                                        RETROPERITIONEAL ULTRASOUND

 

History- renal insufficiency, abnormal labs. 585.3.

 

Noncontrast CT studies of the abdomen of 2008 and 2007 were 
reviewed.

 

TECHNIQUE: The kidneys and bladder were evaluated utilizing dynamic scanning.

 

 

FINDINGS:

There is mild prominence of the right pyelocalyceal system which may represent a
 prominent extra renal pelvis. The findings are similar to the prior CT scan. 
Very mild or early hydronephrosis cannot be completely excluded.  This is of 
uncertain etiology. There is no hydronephrosis on the left.

 

There is a small (2.7 x 2.2 x 2.2 cm) cyst involving the anterior aspect of the 
lower pole of the right kidney. This has increased in size on the CT scan 
2008 the which time it measured approximately 1 cm. There also is a tiny 
nodule within the cyst. Overall, the cyst and benign appearing but since this 
has increased in size and has a small mural nodule, a follow-up study in 
approximately 6 months is suggested. 

 

It is noted the prior CT scan demonstrated a cyst within the left kidney 
laterally which is not well-seen on this study.

 

There are tiny calcifications in the midpolar region of the left kidney and 
lower pole the right kidney which are unchanged and are consistent with stable 
small renal calculi.

 

Renal measurements - Right kidney: 9.0 x 5.3 x 4.5 cm.  Left kidney: 9.9 x 5.4 x
 5.3 cm.

 

Bladder - partially filled and grossly unremarkable.

 

 

CONCLUSION: 

                                        1. Mild prominence of the right pyelocalyceal system. This appears similar to prior

 CT scan of 2008 and probably represents a prominent extra renal pelvis. 
Very mild or early hydronephrosis cannot be excluded.

                                        2. There is no hydronephrosis on the left.

                                        3. The kidneys are normal in size and echogenicity. 

                                        4. 2.7 x 2.2 x 2.2 cm right renal cyst. The cyst has increased in size for a prior

 CT scan of a 2008 and also shows a tiny mural nodule. Is also noted there
 was a left renal cyst on the prior CT scan is not well demonstrated on this 
study. For these reasons and also to follow-up the prominent right pyelocalyceal
 system, a follow-up study in 6 months is suggested.

                                        5. Tiny calcifications within each kidney which are similar to the prior CT scans,

 probable stable calculi.

 

 

 

SL: 16

Mac Lester M.D.

                                                          2015                 

                                                      -

                                        -





Read by:  Mac Lester MD

Dictated Date/time:  02/16/15 10:11

Electronically Signed by:  Mac Lester MD                 02/16/15 
10:23

FINAL REPORT

                                        [facility]                    

 

                          Shoulder series DX                            Shoulder series DX                  

                                        RIGHT SHOULDER

                                        (2 views)

 

HISTORY: Right shoulder pain. 719.41

 

TECHNIQUE: The right shoulder was evaluated in internal and external rotation. A
 transthoracic view was also obtained.   

 

FINDINGS: 

                                        1. Slight deformity of the right humeral neck probably related to an old healed 

fracture.

 

                                        2. Mild degenerative change involving the glenohumeral joint. There slight narrowing

 of the joint and mild anterior osteophyte formation.

 

                                        3. The acromioclavicular joint is intact.

 

                                        4. There is no evidence of acute fracture, dislocation, or acute change.

 

                                        5. There are no destructive lesions to suggest metastasis.

 

                                        6. Postoperative change involving the right axilla.

 

 

 

Coding:

Shoulder series 

CPT Code:  05138

SL: 12

 

Mac Lester M.D.

                                                          2014                 

                                                      -

                                        -





Read by:  Mac Lester MD

Dictated Date/time:  14 12:20

Electronically Signed by:  Mac Lester MD                 14 
12:22

FINAL REPORT

                                         [facility] [place]                    

 

                          Spine lumbar 2 or 3 views DX                            Spine lumbar 2 or 3 views 

DX                                      HISTORY: Interstitial cystitis

 

TECHNIQUE: AP and lateral views of the lumbar spine

 

COMPARISON: None

 

FINDINGS:  

 

No acute fracture or dislocation. No suspicious osseous lesion. Moderate 
multilevel degenerative disease is seen, with multilevel facet arthrosis. 
Findings are worse from L4-S1. Changes related to previous posterior fusion seen
 at L4-S1.

 

Thin curvilinear radiopaque density is seen in the projection of S3. Note is 
made of cholecystectomy clips.

 

IMPRESSION:

 

 

                                        1. Moderate multilevel degenerative disc disease.

                                        2. Curvilinear radiopaque density in the projection of the coccyx, correlate with

 patient's stent device.

                                                          2014                 

                                                      -

                                        -





Read by:  Vonda Salazar MD

Dictated Date/time:  14 12:59

Electronically Signed by:  Vonda Salazar MD                         14 
13:02

FINAL REPORT

                                         [facility] [place]                    

 

                          Spine sacrum AP/Lat DX                            Spine sacrum AP/Lat DX          

                                        HISTORY: Cystitis

 

TECHNIQUE: Two views of the sacrum and coccyx

 

COMPARISON: None

 

FINDINGS:  

 

No acute osseous abnormalities. Partial visualization of cervical hardware in 
L4-S1.

 

Curvilinear radiopaque density is seen projecting at S3, correlate with the 
patient's previously inserted stent device.

 

IMPRESSION:

 

Curvilinear radiopaque density seen projecting at S3, correlate with positioning
 of patient's known stent.

                                                          2014                 

                                                      -

                                        -





Read by:  Vonda Salazar MD

Dictated Date/time:  14 13:01

Electronically Signed by:  Vonda Salazar MD                         14 
13:01

FINAL REPORT

                                         [facility] [place]                    

 

                          Breast biopsy uni US Guided w clip MA                            Breast biopsy uni

 US Guided w clip MA                            - BREAST BIOPSY UNI US GUIDED W CLIP

 MA/R

ULTRASOUND GUIDED BIOPSY RIGHT BREAST WITH MARKING DEVICE INSERTED AND POST 
DIGITAL MAMMOGRAPHIC AND ULTRASOUND IMAGIN2014

CLINICAL: Mass.  



PATIENT CONSENT: Oral and written informed consent was obtained. Risks, 
benefits, and alternatives were discussed with the patient. Risks include but 
are not limited to pain, infection, bleeding, incomplete procedure, repeat 
procedure, pneumothorax, damage to surrounding tissues, and allergic reaction.  
The patient understands the plan and wishes to proceed.  A time out was 
performed immediately prior to the procedure.  



Correlation is made to exams dated:  2014 ultrasound, 2014 mammogram
 - [facility], 10/17/2014 mammogram - [facility], 2012 mammogram, 2010 mammogram and 2008 mammogram - 
[facility].  



An ultrasound guided biopsy using real-time ultrasound was performed for the 
concerning 1 cm indistinct irregular shaped solid mass located in the right 
breast at 7 o'clock posterior depth 4 cm from the nipple.  This was described on
 the previous mammography and ultrasound reports.  The skin was prepped in the 
usual manner.  8 ccs of 1% lidocaine was administered during the procedure.  A 
skin nick was made in the breast.  The abnormality was approached from the 
lateral aspect.  A 12 gauge biopsy needle was placed adjacent to the abnormality
 under ultrasound guidance.  Once the needle was documented to be in the correct
 location, three cores were obtained using the vacuum assisted Bard EnCor 
Enspire device.  A Gel Az UltraCor S shaped clip was inserted into the biopsy 
cavity.  A skin adhesive and a sterile dressing were applied to the access site.
  Post procedure digital mammographic and ultrasound imaging demonstrates the 
clip at the targeted area and partial removal of the abnormality.  The specimens
 were sent to the laboratory for pathological analysis.  





IMPRESSION: ULTRASOUND GUIDED BIOPSY MALIGNANT 



Ultrasound guided biopsy of the 1 cm solid mass in the right breast at 7 o'clock
 posterior depth 4 cm from the nipple was successful with no apparent post 
procedure complications.  



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 
GRADE 1-2.

       - Negative for lymphovascular invasion.

       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
 receptor positive, progesterone receptor positive, HER2 negative".  Pathology 
results are concordant with imaging findings.  



A surgical consult, a surgical excision and a chemo oncology consultation are 
recommended.  

A phone call was made to the physician at 1355 hrs 14.





Laila cage/:2014 14:42:00  



Imaging Technologist: Michele Monzon [facility]

This exam was dictated and interpreted by [id number] for [facility].

                                                          2014                 

                                                      -

                                        -





Read by:  Laila Fuller MD

Dictated Date/time:  14 14:42

Electronically Signed by:  Laila Fuller MD                           14 
14:42

FINAL REPORT

                                        [facility]                    

 

                    Breast US                            Breast US                            - BREAST US/R



ULTRASOUND OF RIGHT BREAST AND RIGHT AXILLA: 2014

CLINICAL: Mass.  



Comparison is made to exams dated:  2014 mammogram - [facility], 10/17/2014 mammogram - [facility], 2012 
mammogram, 2010 mammogram and 2008 mammogram - [facility].  

Color flow and real-time ultrasound of the right breast and axilla were 
performed.  Gray scale images of the real-time examination were reviewed.  



There is a 1 cm taller than wide irregular solid mass with an indistinct margin 
in the right breast at 7 o'clock posterior depth 4 cm from the nipple.  This 
irregular solid mass is hypoechoic with posterior acoustic shadowing.  This 
correlates with mammography findings.  Color flow imaging demonstrates that 
there is no vascularity present.  

No abnormalities were seen sonographically in the right axilla.  



IMPRESSION: HIGHLY SUGGESTIVE OF MALIGNANCY - FOLLOW-UP RECOMMENDED

The 1 cm taller than wide irregular solid mass in the right breast likely 
represents carcinoma and is highly suggestive of malignancy.  An ultrasound 
guided biopsy is recommended.  

A phone call was made to the physician's office and the results were reviewed 
with the patient.  



SUMMARY:

The patient scheduled her procedure prior to leaving the [facility].   Critical findings were called to the physician's 
nurse Kamini at 0856 hours on the day of the exam.  





Laila cage/maria de jesus:2014 08:56:15  



Imaging Technologist: Michele Monzon [facility]

This exam was dictated and interpreted by [id number] for [facility].  

letter sent: Biopsy  

Ultrasound BI-RADS: 5 Highly suggestive of malignancy

                                                          2014                 

                                                      -

                                        -





Read by:  Laila Fuller MD

Dictated Date/time:  14 08:56

Electronically Signed by:  Laila Fuller MD                           14 
08:56

FINAL REPORT

                                        [facility]                    

 

                          Digital Mammo DX Uni MA                            Digital Mammo DX Uni MA        

                                         - DIGITAL MAMMO DX UNI MA/R

UNILATERAL RIGHT DIGITAL DIAGNOSTIC MAMMOGRAM WITH CAD: 2014

CLINICAL: Mammographic Abnormality.  



Current study was evaluated with a Computer Aided Detection (CAD) system.  

Comparison is made to exams dated:  10/17/2014 mammogram - [facility], 2012 mammogram and 2010 [facility].  

There are scattered fibroglandular densities in the right breast.  

There is a mass in the right breast at 6 o'clock posterior depth.  This is seen 
in additional views.  

No other significant masses or calcifications are seen in the breast.  



IMPRESSION: INCOMPLETE: NEEDS ADDITIONAL IMAGING EVALUATION

The mass in the right breast is indeterminate.  An ultrasound is recommended.  

The results were reviewed with the patient.  



SUMMARY:

Ultrasound will be performed at this time; please see dedicated separate report.
  





Laila cage/penrad:2014 08:49:39  



Imaging Technologist: Miguelina Figueroa, [facility]

This exam was dictated and interpreted by [id number] for [facility].  



Mammogram BI-RADS: 0 Indeterminate

                                                          2014                 

                                                      -

                                        -





Read by:  Laila Fuller MD

Dictated Date/time:  14 08:49

Electronically Signed by:  Laila Fuller MD                           14 
08:49

FINAL REPORT

                                        [facility]                    

 

                          Digital Mammo Screening Alex MA                            Digital Mammo Screening 

Alex MA                                   - DIGITAL MAMMO SCREENING ALEX MA

BILATERAL DIGITAL SCREENING MAMMOGRAM WITH CAD: 10/17/2014

CLINICAL: Other Screening Mammogram.  



Current study was evaluated with a Computer Aided Detection (CAD) system.  

Comparison is made to exams dated:  2012 mammogram, 2010 mammogram and
 2008 mammogram - [facility].  

There are scattered fibroglandular densities in both breasts.  

There are benign calcifications in the left breast.  

There is a mass in the right breast at 7 o'clock posterior depth.  

No other significant masses, calcifications, or other findings are seen in 
either breast.  



IMPRESSION: INCOMPLETE: NEEDS ADDITIONAL IMAGING EVALUATION

The mass in the right breast is indeterminate.  Spot compression and lateral 
views as well as an ultrasound are recommended.  





Laila cage/penrad:10/30/2014 15:10:05  



Imaging Technologist: Yanique ALANIZ)(MILLER), [facility]

This exam was dictated and interpreted by [id number] for [facility] 
[place].  

letter sent: Additional Imaging  

Mammogram BI-RADS: 0 Indeterminate

                                                          10/17/2014                 

                                                      -

                                        -





Read by:  Laila Fuller MD

Dictated Date/time:  10/30/14 15:10

Electronically Signed by:  Laila Fuller MD                           10/30/14 
15:10

FINAL REPORT

                                        [facility] [place]                    

 

                          Spine cervical series DX                            Spine cervical series DX      

                                         

EXAM: Cervical spine.

HISTORY: Neck trauma

COMPARISON: 2007.

TECHNIQUE: 6 views of the cervical spine

 

FINDINGS: 

 

Straightening of the cervical spine may be due to cervical collar or muscle 
spasm. Otherwise, normal alignment of the cervical spine without acute traumatic
 injury seen. Anterior fusion C5-C7. Neuroforaminal stenosis on the right at 
C5-C6.

 

 

 

 

 

 

 

 

 

 

 

 

 

SL: 14

                                                          2014                 

                                                      -

                                        -





Read by:  Gianni Johnston MD

Dictated Date/time:  14 19:56

Electronically Signed by:  Gianni Johnston MD                            14 
19:59

FINAL REPORT

                                        [facility]                    



                                                                                
                                                                                
                                                                                
                                                                                
                                                                                
                                                                                
                                                



Vital Signs

                    





                    Vital Sign                            Value                            Date         

                          Comments                            Source                    

 

                    Respitory Rate                            18                             09/15/2014 

                                                       [facility]                  

  

 

                    Heart Rate                            86                             09/15/2014     

                                                      [facility]                    

 

                    Temperature Oral (F)                            98.0 F                            09/15/2014

                                                        [facility]                 

   

 

                    Diastolic (mm Hg)                            79                             09/15/2014

                                                        [facility]                 

   

 

                    Systolic (mm Hg)                            138                             09/15/2014

                                                        [facility]                 

   

 

                    Height                            160.02 cm                            2014   

                                                      [facility]                    



 

                    Weight                            81.818                             2014     

                                                      [facility]                    

 

                    BMI Calculated                            31.95                             2014

                                                        [facility]                 

   

 

                    Systolic (mm Hg)                            158                             2014

                                                        [facility]                 

   

 

                    Temperature Oral (F)                            98.2 F                            2014

                                                        [facility]                 

   

 

                    Respitory Rate                            20                             2014 

                                                       [facility]                  

  

 

                    Heart Rate                            87                             2014     

                                                      [facility]                    

 

                    Diastolic (mm Hg)                            81                             2014

                                                        [facility]                 

   



                                                                                
                                                                                
                                                                                
                                        



Encounters

                    





                    Location                            Location Details                            Encounter

 Type                            Encounter Number                            Reason For

 Visit                            Attending Provider                            ADM Date

                            DC Date                            Status                

                                        Source                    

 

                          [facility]                            [id number]                 

                                                Lm Salcedo                             2014

                            09/15/2014                                               

                                        [facility] [place]                                               

                          Out Diag Services                            [id number]                 

                                                Michael Elaine                             10/17/2014

                            10/18/2014                                               

                                        [facility]                                               

                    Outpatient                            [id number]                            

                            Michael Elaine                             2014                                                    

                                        [facility]                                               

                    Outpatient                            [id number]                            

                            Michael Elaine                             2014                                                    

                                        [facility] [place]                                              

                          Outpt Diag Services                            [phone]                

                                                Anson Garypel                             2014                                               

                                         [facility] [place]                    

 

                          [facility] [place]                                               

                          Outpt Diag Services                            [phone]                 

                                                Michael Elaine                             2014                                               

                                         [facility]                                               

                    Outpatient                            [id number]                            

                            Michael Elaine                             2015                                                    

                                        [facility] [place]                                               

                          Outpt Diag Services                            [id number]                 

                                                Michael Elaine                             10/06/2015

                            10/07/2015                                               

                                         [facility] [place]                                               

                          Outpt Di[facility]                            [phone]                 

                                                Michael Elaine                             2016                                               

                                         [facility] [place]                    

 

                          [facility] [place]                                               

                          Outpt Diag Services                            [id number]                 

                                                Michael Elaine                             2016                                               

                                         [facility] [place]                    

 

                          [facility] [place]                                               

                          Outpt Diag Services                            [id number]                 

                                                Shukri Hyde                             2016                                               

                                         [facility] [place]                    

 

                          [facility] [place]                                               

                          Outpt Diag Services                            [phone]                 

                                                Shukri Hyde                             2016                                               

                                         [facility] [place]                                               

                          Outpt Di[facility] Services                            [phone]                 

                                                Michael Elaine                             2016                                               

                                         [facility]                                               

                    [facility]                            [id number]                            

                            Michael Elaine                             2016     

                          11/15/2016                                                    

                                        [facility]                    

 

                    [facility]                                                        OP [facility]

 Patients                            [id number]                                    

                          [facility]                             2017                                                         [facility] 

[facility]                    

 

                    [facility]                                                        OP [facility]

 Patients                            [id number]                                    

                          [facility]                             2017                                                         [facility] 

[facility]                    

 

                    [facility]                                                        OP [facility]

 Patients                            [id number]                                    

                          [facility]                             2017                                                         [facility] 

[facility]                    

 

                          [facility] [place]                                               

                          Outpt Diag Services                            [id number]                 

                                                Michael Elaine                             2017                                               

                                         [facility] [place]                    

 

                    [facility]                                                        OP Therapy

 Patients                            [id number]                                    

                          Windy Jessica                             2017                                                         [facility]

 [facility]                    

 

                    [facility]                                                        OP Therapy

 Patients                            [id number]                                    

                          Windy Lopez                             2017                                                         [facility]

 [facility]                    

 

                          [facility] [place]                                               

                          Outpt Diag Services                            [id number]                 

                                                Michael Elaine                             2018                                               

                                         [facility] [place]                    

 

                          [facility] [place]                                               

                          Outpt Diag Services                            [phone]                 

                                                Michael Elaine                             2018                                               

                                         [facility] [place]                    

 

                          [facility] [place]                                               

                          Outpt Diag Services                            [phone]                 

                                                Michael Elaine                             2018                                               

                                         [facility] [place]                    



                                                                                
                                                                                
                                                                                
                                                                                
                            



Procedures

                    





                    Procedure                            Code                            Date           

                          Perfomer                            Comments                        

                                        Source                    

 

                          Abdominal hysterectomy                            [id number]                       

                                                                                                    

 [place]                    

 

                    Appendectomy                            [id number]                                    

                                                                             [place]

                    

 

                          Cataract surgery<sup>1</sup>                            [id number]                 

                                                                            bilat               

                                         [place]                    

 

                    Cholecystectomy                            [id number]                                 

                                                                               [facility]

                    

 

                          Kidney biopsy<sup>2</sup>                            [id number]                      

                                                                            removal of kidney stones 

                                        [facility]                    

 

                                        Replacement of electronic stimulator into bladder<sup>3</sup>                   

                    [id number]                                                                      

                          Placed in left hip                            [facility]             

       

 

                          Suspension of bladder<sup>4</sup>                            3942005              

                                                                            x3               

                                        [facility]                    

 

                    Tonsillectomy                            [id number]                                  

                                                                              [facility]

                    

 

                          Abdominal hysterectomy                            [id number]                       

                                                                                                    

 [facility] [place]                    

 

                    Appendectomy                            [id number]                                    

                                                                             [facility] [place]

                    

 

                          Cataract surgery<sup>1</sup>                            [id number]                 

                                                                            bilat               

                                         [facility] [place]                    

 

                    Cholecystectomy                            [id number]                                 

                                                                                [facility] 

[place]                    

 

                          Kidney biopsy<sup>2</sup>                            [id number]                      

                                                                            removal of kidney stones 

                                         [facility] [place]                    

 

                                        Replacement of electronic stimulator into bladder<sup>3</sup>                   

                    [id number]                                                                      

                          Placed in left hip                             [facility] [place]      

              

 

                          Suspension of bladder<sup>4</sup>                            3942005              

                                                                            x3               

                                         [facility] [place]                    

 

                    Tonsillectomy                            [id number]                                  

                                                                               [facility] [place]

                    

 

                          Abdominal hysterectomy                            [id number]                       

                                                                                                    

[facility]                    

 

                    Appendectomy                            [id number]                                    

                                                                            [facility]

 [facility]                    

 

                          Cataract surgery<sup>1</sup>                            [id number]                 

                                                                            bilat               

                                        [facility]                    

 

                    Cholecystectomy                            [id number]                                 

                                                                               [facility]                    

 

                          Kidney biopsy<sup>2</sup>                            [id number]                      

                                                                            removal of kidney stones 

                                        [facility]                    

 

                                        Replacement of electronic stimulator into bladder<sup>3</sup>                   

                    [id number]                                                                      

                          Placed in left hip                            [facility]    

                

 

                          Suspension of bladder<sup>4</sup>                            3942005              

                                                                            x3               

                                        [facility]                    

 

                    Tonsillectomy                            [id number]                                  

                                                                              [facility]

## 2018-10-16 NOTE — XMS REPORT
Summary of Care: 3/12/18 - 3/12/18

                             Created on: 2056



VERONICA LEAVITT

External Reference #: [id number]

: 1940

Sex: Female



Demographics







                          Address                   [address] [place]  [address]-

 

                          Home Phone                [phone]

 

                          Preferred Language        English

 

                          Marital Status            

 

                          Scientology Affiliation     None

 

                          Race                      White

 

                                        Additional Race(s)  

 

                          Ethnic Group              Unknown





Author







                          Author                    [facility] [place]

 

                          Address                   Unknown

 

                          Phone                     Unavailable







Encounter





HQ Sanjiv_kaylan(FIN) [id number] Date(s): 3/12/18 - 3/12/18

[facility] [place] [address] [facility] [address] [place], [place] [address]
46- [phone]

Encounter Diagnosis

Unspecified abdominal pain (Final) - 3/16/18

Calculus of kidney (Final) - 

Other specified disorders of kidney and ureter (Final) - 

Cyst of kidney, acquired (Final) - 

Discharge Disposition: Home or Self Care

Attending Physician: Michael Elaine MD





Vital Signs





No data available for this section



Problem List







    



              Condition     Effective Dates     Status       Health Status     Informant

 

    



                           Chest                     Active  



                                         pain(Confirmed)    

 

    



                           HLD -                     Active  



                                         Hyperlipidemia(Confi    



                                         rmed)    

 

    



                           HTN                       Resolved  



                                         (hypertension)(Confi    



                                         rmed)    

 

    



                           HTN -                     Active  



                                         Hypertension(Confirm    



                                         ed)    

 

    



                           Hypercholesterolemia      Resolved  



                                         (Confirmed)    







Allergies, Adverse Reactions, Alerts







   



                 Substance       Reaction        Severity        Status

 

   



                           cipro                     Active

 

   



                           Tape                      Active







Medications





No data available for this section



Results





No data available for this section



Immunizations





Given and Recorded





   



                 Vaccine         Date            Status          Refusal Reason

 

   



                     pneumococcal 23-valent vaccine     11             Given 







Procedures







    



              Procedure     Date         Related Diagnosis     Body Site     Status

 

    



                           Abdominal hysterectomy        Completed

 

    



                           Appendectomy              Completed

 

    



                           Cataract surgery1         Completed

 

    



                           Cholecystectomy           Completed

 

    



                           Kidney biopsy2            Completed

 

    



                           Replacement of electronic stimulator into        Completed



                                         bladder3    

 

    



                           Suspension of bladder4        Completed

 

    



                           Tonsillectomy             Completed







1bilat



2removal of kidney stones



3Placed in left hip



4x3



Social History





No data available for this section



Assessment and Plan





No data available for this section

## 2018-10-16 NOTE — XMS REPORT
Summary of Care: 16 - 16

                             Created on: 2090



VERONICA LEAVITT

External Reference #: [id number]

: 1940

Sex: Female



Demographics







                          Address                   [address] [place]  [address]-

 

                          Home Phone                [phone]

 

                          Preferred Language        English

 

                          Marital Status            

 

                          Pentecostalism Affiliation     None

 

                          Race                      White/

 

                          Ethnic Group              Unknown





Author







                          Author                    [facility]

 

                          Organization              [facility]

 

                          Address                   Unknown

 

                          Phone                     Unavailable







Encounter





HQ Carl(PALMIRA) [id number] Date(s): 16 - 16

[facility] [address] [place] [address]-     [phone]
[phone]

Discharge Disposition: Home or Self Care

Attending Physician: Michael Elaine MD

Referring Physician: Michael Elaine MD





Vital Signs





No data available for this section



Problem List







    



              Condition     Effective Dates     Status       Health Status     Informant

 

    



                           Chest                     Active  



                                         pain(Confirmed)    

 

    



                           HLD -                     Active  



                                         Hyperlipidemia(Confi    



                                         rmed)    

 

    



                           HTN                       Resolved  



                                         (hypertension)(Confi    



                                         rmed)    

 

    



                           HTN -                     Active  



                                         Hypertension(Confirm    



                                         ed)    

 

    



                           Hypercholesterolemia      Resolved  



                                         (Confirmed)    

 

    



                           Implant(Confirmed)1       Active  







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



Allergies, Adverse Reactions, Alerts







   



                 Substance       Reaction        Severity        Status

 

   



                           cipro                     Active

 

   



                           Tape                      Active







Medications





No data available for this section



Results





No data available for this section



Immunizations





Given and Recorded





   



                 Vaccine         Date            Status          Refusal Reason

 

   



                     pneumococcal 23-valent vaccine     11             Given 







Procedures







   



                 Procedure       Date            Related Diagnosis     Body Site

 

   



                                         Abdominal hysterectomy   

 

   



                                         Appendectomy   

 

   



                                         Cataract surgery1   

 

   



                                         Cholecystectomy   

 

   



                                         Kidney biopsy2   

 

   



                                         Replacement of electronic stimulator into   



                                         bladder3   

 

   



                                         Suspension of bladder4   

 

   



                                         Tonsillectomy   







1bilat



2removal of kidney stones



3Placed in left hip



4x3



Social History





No data available for this section



Assessment and Plan





No data available for this section

## 2018-10-16 NOTE — XMS REPORT
Summary of Care: 3/11/16 - 3/11/16

                             Created on: 2092



VERONICA LEAVITT

External Reference #: [id number]

: 1940

Sex: Female



Demographics







                          Address                   [address] [place]  [address]-

 

                          Home Phone                [phone]

 

                          Preferred Language        English

 

                          Marital Status            

 

                          Confucianist Affiliation     None

 

                          Race                      White/

 

                          Ethnic Group              Unknown





Author







                          Author                    [facility] [place]

 

                          Address                   Unknown

 

                          Phone                     Unavailable







Encounter





HQ Shirar_kaylan(FIN) [id number] Date(s): 3/11/16 - 3/11/16

[facility] [place] [address] [place] [address]- [phone]

Discharge Disposition: Home

Attending Physician: Michael Elaine MD





Vital Signs





No data available for this section



Problem List







    



              Condition     Effective Dates     Status       Health Status     Informant

 

    



                           Chest                     Active  



                                         pain(Confirmed)    

 

    



                           HLD -                     Active  



                                         Hyperlipidemia(Confi    



                                         rmed)    

 

    



                           HTN                       Resolved  



                                         (hypertension)(Confi    



                                         rmed)    

 

    



                           HTN -                     Active  



                                         Hypertension(Confirm    



                                         ed)    

 

    



                           Hypercholesterolemia      Resolved  



                                         (Confirmed)    

 

    



                           Implant(Confirmed)1       Active  







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



Allergies, Adverse Reactions, Alerts







   



                 Substance       Reaction        Severity        Status

 

   



                           cipro                     Active

 

   



                           Tape                      Active







Medications





No data available for this section



Results





No data available for this section



Immunizations







  



                     Vaccine             Date                Refusal Reason

 

  



                           pneumococcal 23-valent vaccine     11 







Procedures







   



                 Procedure       Date            Related Diagnosis     Body Site

 

   



                                         Abdominal hysterectomy   

 

   



                                         Appendectomy   

 

   



                                         Cataract surgery1   

 

   



                                         Cholecystectomy   

 

   



                                         Kidney biopsy2   

 

   



                                         Replacement of electronic stimulator into   



                                         bladder3   

 

   



                                         Suspension of bladder4   

 

   



                                         Tonsillectomy   







1bilat



2removal of kidney stones



3Placed in left hip



4x3



Social History





No data available for this section



Assessment and Plan





No data available for this section

## 2018-10-16 NOTE — XMS REPORT
Summary of Care: 8/3/15 - 8/3/15

                             Created on: 2045



VERONICA LEAVITT

External Reference #: [id number]

: 1940

Sex: Female



Demographics







                          Address                   [address] [place]  [address]-

 

                          Home Phone                [phone]

 

                          Preferred Language        English

 

                          Marital Status            

 

                          Christian Affiliation     None

 

                          Race                      White/

 

                          Ethnic Group              Unknown





Author







                          Author                    [facility]

 

                          Organization              [facility]

 

                          Address                   Unknown

 

                          Phone                     Unavailable







Encounter





HQ Carl(PALMIRA) [id number] Date(s): 8/3/15 - 8/3/15

[facility] [address] [place] [address]-     [phone]
[phone]

Discharge Disposition: Home

Attending Physician: Michael Elaine MD

Referring Physician: Michael Elaine MD





Vital Signs





No data available for this section



Problem List







    



              Condition     Effective Dates     Status       Health Status     Informant

 

    



                           Chest                     Active  



                                         pain(Confirmed)    

 

    



                           HLD -                     Active  



                                         Hyperlipidemia(Confi    



                                         rmed)    

 

    



                           HTN                       Resolved  



                                         (hypertension)(Confi    



                                         rmed)    

 

    



                           HTN -                     Active  



                                         Hypertension(Confirm    



                                         ed)    

 

    



                           Hypercholesterolemia      Resolved  



                                         (Confirmed)    

 

    



                           Implant(Confirmed)1       Active  







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



Allergies, Adverse Reactions, Alerts







   



                 Substance       Reaction        Severity        Status

 

   



                           cipro                     Active

 

   



                           Tape                      Active







Medications





No data available for this section



Results





No data available for this section



Immunizations







  



                     Vaccine             Date                Refusal Reason

 

  



                           pneumococcal 23-valent vaccine     11 







Procedures







   



                 Procedure       Date            Related Diagnosis     Body Site

 

   



                                         Abdominal hysterectomy   

 

   



                                         Appendectomy   

 

   



                                         Cataract surgery1   

 

   



                                         Cholecystectomy   

 

   



                                         Kidney biopsy2   

 

   



                                         Replacement of electronic stimulator into   



                                         bladder3   

 

   



                                         Suspension of bladder4   

 

   



                                         Tonsillectomy   







1bilat



2removal of kidney stones



3Placed in left hip



4x3



Social History





No data available for this section



Assessment and Plan





No data available for this section

## 2018-10-16 NOTE — XMS REPORT
Summary of Care: 10/6/15 - 10/6/15

                             Created on: 2029



VERONICA LEAVITT

External Reference #: [id number]

: 1940

Sex: Female



Demographics







                          Address                   [address] [place]  [address]-

 

                          Home Phone                [phone]

 

                          Preferred Language        English

 

                          Marital Status            

 

                          Yarsanism Affiliation     None

 

                          Race                      White/

 

                          Ethnic Group              Unknown





Author







                          Author                    [facility] [place]

 

                          Address                   Unknown

 

                          Phone                     Unavailable







Encounter





HQ Sanjiv_kaylan(FIN) [id number] Date(s): 10/6/15 - 10/6/15

[facility] [place] [address] [place] [address]- [phone]

Discharge Disposition: Home

Attending Physician: Michael Elaine MD





Vital Signs





No data available for this section



Problem List







    



              Condition     Effective Dates     Status       Health Status     Informant

 

    



                           Chest                     Active  



                                         pain(Confirmed)    

 

    



                           HLD -                     Active  



                                         Hyperlipidemia(Confi    



                                         rmed)    

 

    



                           HTN                       Resolved  



                                         (hypertension)(Confi    



                                         rmed)    

 

    



                           HTN -                     Active  



                                         Hypertension(Confirm    



                                         ed)    

 

    



                           Hypercholesterolemia      Resolved  



                                         (Confirmed)    

 

    



                           Implant(Confirmed)1       Active  







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



Allergies, Adverse Reactions, Alerts







   



                 Substance       Reaction        Severity        Status

 

   



                           cipro                     Active

 

   



                           Tape                      Active







Medications





No data available for this section



Results





No data available for this section



Immunizations







  



                     Vaccine             Date                Refusal Reason

 

  



                           pneumococcal 23-valent vaccine     11 







Procedures







   



                 Procedure       Date            Related Diagnosis     Body Site

 

   



                                         Abdominal hysterectomy   

 

   



                                         Appendectomy   

 

   



                                         Cataract surgery1   

 

   



                                         Cholecystectomy   

 

   



                                         Kidney biopsy2   

 

   



                                         Replacement of electronic stimulator into   



                                         bladder3   

 

   



                                         Suspension of bladder4   

 

   



                                         Tonsillectomy   







1bilat



2removal of kidney stones



3Placed in left hip



4x3



Social History





No data available for this section



Assessment and Plan





No data available for this section

## 2018-10-16 NOTE — XMS REPORT
Summary of Care: 17 - 17

                             Created on: 2128



VERONICA LEAVITT

External Reference #: [id number]

: 1940

Sex: Female



Demographics







                          Address                   [address] [place]  [address]-

 

                          Home Phone                [phone]

 

                          Preferred Language        English

 

                          Marital Status            

 

                          Anglican Affiliation     None

 

                          Race                      White

 

                          Additional Race(s)        White/



 

                          Ethnic Group              Non-





Author







                          Author                    [facility]

 

                          Organization              [facility]

 

                          Address                   Unknown

 

                          Phone                     Unavailable







Encounter





HQ Sanjiv_kaylan(FIN) [id number] Date(s): 17 - 17

[facility]

Discharge Disposition: Home or Self Care

Attending Physician: Windy Lopez MD





Vital Signs





No data available for this section



Problem List







    



              Condition     Effective Dates     Status       Health Status     Informant

 

    



                           Chest                     Active  



                                         pain(Confirmed)    

 

    



                           HLD -                     Active  



                                         Hyperlipidemia(Confi    



                                         rmed)    

 

    



                           HTN                       Resolved  



                                         (hypertension)(Confi    



                                         rmed)    

 

    



                           HTN -                     Active  



                                         Hypertension(Confirm    



                                         ed)    

 

    



                           Hypercholesterolemia      Resolved  



                                         (Confirmed)    

 

    



                           Implant(Confirmed)1       Active  







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



Allergies, Adverse Reactions, Alerts







   



                 Substance       Reaction        Severity        Status

 

   



                           cipro                     Active

 

   



                           Tape                      Active







Medications





No data available for this section



Results





No data available for this section



Immunizations





Given and Recorded





   



                 Vaccine         Date            Status          Refusal Reason

 

   



                     pneumococcal 23-valent vaccine     11             Given 







Procedures







   



                 Procedure       Date            Related Diagnosis     Body Site

 

   



                                         Abdominal hysterectomy   

 

   



                                         Appendectomy   

 

   



                                         Cataract surgery1   

 

   



                                         Cholecystectomy   

 

   



                                         Kidney biopsy2   

 

   



                                         Replacement of electronic stimulator into   



                                         bladder3   

 

   



                                         Suspension of bladder4   

 

   



                                         Tonsillectomy   







1bilat



2removal of kidney stones



3Placed in left hip



4x3



Social History





No data available for this section



Assessment and Plan





No data available for this section

## 2018-10-16 NOTE — XMS REPORT
Summary of Care: 14 - 14

                             Created on: 2082



VERONICA LEAVITT

External Reference #: [id number]

: 1940

Sex: Female



Demographics







                          Address                   [address] [place]  [address]

 

                          Home Phone                [phone]

 

                          Preferred Language        English

 

                          Marital Status            

 

                          Druze Affiliation     None

 

                          Race                      White/

 

                          Ethnic Group              Other





Author







                          Organization              Unknown

 

                          Address                   Unknown

 

                          Phone                     Unavailable







Encounter





HQ Encntr_kaylan(FIN) [id number] Date(s): 14 - 14

[facility] [address] [address]

Discharge Disposition: Home

Physician Attending: Michael Elaine MD

Physician_Referring: Michael Elaine MD





Reason for Visit





BREAST [place]



Problem List







    



              Condition     Effective Dates     Status       Health Status     Informant

 

    



                           Chest                     Active  



                                         pain(Confirmed)    

 

    



                           HLD -                     Active  



                                         Hyperlipidemia(Confi    



                                         rmed)    

 

    



                           HTN                       Resolved  



                                         (hypertension)(Confi    



                                         rmed)    

 

    



                           HTN -                     Active  



                                         Hypertension(Confirm    



                                         ed)    

 

    



                           Hypercholesterolemia      Resolved  



                                         (Confirmed)    

 

    



                           Implant(Confirmed)1       Active  







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



Allergies, Adverse Reactions, Alerts







   



                 Substance       Reaction        Severity        Status

 

   



                           cipro                     Active

 

   



                           Tape                      Active







Medications





No data available for this section



Medications Administered During Your Visit





No data available for this section



Immunizations







  



                     Vaccine             Date                Refusal Reason

 

  



                           pneumococcal 23-valent vaccine     11

## 2018-10-16 NOTE — XMS REPORT
Summary of Care: 18 - 18

                             Created on: 2068



VERONICA LEAVITT

External Reference #: [id number]

: 1940

Sex: Female



Demographics







                          Address                   [address] [place]  [address]-

 

                          Home Phone                [phone]

 

                          Preferred Language        English

 

                          Marital Status            

 

                          Yarsani Affiliation     None

 

                          Race                      White

 

                                        Additional Race(s)  

 

                          Ethnic Group              Unknown





Author







                          Author                    [facility] [place]

 

                          Address                   Unknown

 

                          Phone                     Unavailable







Encounter





HQ Shirar_kaylan(FIN) [id number] Date(s): 18 - 18

[facility] [place] [address] [facility] [address] [place] [address]
46- [phone]

Discharge Disposition: Home or Self Care

Attending Physician: Michael Elaine MD





Vital Signs





No data available for this section



Problem List







    



              Condition     Effective Dates     Status       Health Status     Informant

 

    



                           Chest                     Active  



                                         pain(Confirmed)    

 

    



                           HLD -                     Active  



                                         Hyperlipidemia(Confi    



                                         rmed)    

 

    



                           HTN                       Resolved  



                                         (hypertension)(Confi    



                                         rmed)    

 

    



                           HTN -                     Active  



                                         Hypertension(Confirm    



                                         ed)    

 

    



                           Hypercholesterolemia      Resolved  



                                         (Confirmed)    







Allergies, Adverse Reactions, Alerts







   



                 Substance       Reaction        Severity        Status

 

   



                           cipro                     Active

 

   



                           Tape                      Active







Medications





No data available for this section



Results





No data available for this section



Immunizations





Given and Recorded





   



                 Vaccine         Date            Status          Refusal Reason

 

   



                     pneumococcal 23-valent vaccine     11             Given 







Procedures







    



              Procedure     Date         Related Diagnosis     Body Site     Status

 

    



                           Abdominal hysterectomy        Completed

 

    



                           Appendectomy              Completed

 

    



                           Cataract surgery1         Completed

 

    



                           Cholecystectomy           Completed

 

    



                           Kidney biopsy2            Completed

 

    



                           Replacement of electronic stimulator into        Completed



                                         bladder3    

 

    



                           Suspension of bladder4        Completed

 

    



                           Tonsillectomy             Completed







1bilat



2removal of kidney stones



3Placed in left hip



4x3



Social History





No data available for this section



Assessment and Plan





No data available for this section

## 2018-10-16 NOTE — XMS REPORT
Summary of Care: 16 - 16

                             Created on: 2091



VERONICA LEAVITT

External Reference #: [id number]

: 1940

Sex: Female



Demographics







                          Address                   [address] [place]  [address]-

 

                          Home Phone                [phone]

 

                          Preferred Language        English

 

                          Marital Status            

 

                          Roman Catholic Affiliation     None

 

                          Race                      White/

 

                          Ethnic Group              Unknown





Author







                          Author                    [facility] [place]

 

                          Address                   Unknown

 

                          Phone                     Unavailable







Encounter





HQ Shirar_kaylan(FIN) [id number] Date(s): 16 - 16

[facility] [place] [address] [place] [address]- [phone]

Discharge Disposition: Home

Attending Physician: Michael Elaine MD





Vital Signs





No data available for this section



Problem List







    



              Condition     Effective Dates     Status       Health Status     Informant

 

    



                           Chest                     Active  



                                         pain(Confirmed)    

 

    



                           HLD -                     Active  



                                         Hyperlipidemia(Confi    



                                         rmed)    

 

    



                           HTN                       Resolved  



                                         (hypertension)(Confi    



                                         rmed)    

 

    



                           HTN -                     Active  



                                         Hypertension(Confirm    



                                         ed)    

 

    



                           Hypercholesterolemia      Resolved  



                                         (Confirmed)    

 

    



                           Implant(Confirmed)1       Active  







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



Allergies, Adverse Reactions, Alerts







   



                 Substance       Reaction        Severity        Status

 

   



                           cipro                     Active

 

   



                           Tape                      Active







Medications





No data available for this section



Results





No data available for this section



Immunizations







  



                     Vaccine             Date                Refusal Reason

 

  



                           pneumococcal 23-valent vaccine     11 







Procedures







   



                 Procedure       Date            Related Diagnosis     Body Site

 

   



                                         Abdominal hysterectomy   

 

   



                                         Appendectomy   

 

   



                                         Cataract surgery1   

 

   



                                         Cholecystectomy   

 

   



                                         Kidney biopsy2   

 

   



                                         Replacement of electronic stimulator into   



                                         bladder3   

 

   



                                         Suspension of bladder4   

 

   



                                         Tonsillectomy   







1bilat



2removal of kidney stones



3Placed in left hip



4x3



Social History





No data available for this section



Assessment and Plan





No data available for this section

## 2018-10-16 NOTE — XMS REPORT
Summary of Care: 17 - 18

                             Created on: 2058



VERONICA LEAVITT

External Reference #: [id number]

: 1940

Sex: Female



Demographics







                          Address                   [address] [place]  [address]

 

                          Home Phone                [phone]

 

                          Preferred Language        English

 

                          Marital Status            

 

                          Congregational Affiliation     None

 

                          Race                      White

 

                                        Additional Race(s)  

 

                          Ethnic Group              Unknown





Author







                          Author                    [facility]

 

                          Organization              [facility]

 

                          Address                   Unknown

 

                          Phone                     Unavailable







Encounter





HQ Carl(FIN) [id number] Date(s): 17 - 18

[facility]

Encounter Diagnosis

Postlaminectomy syndrome, not elsewhere classified (Final) - 18

Cervicalgia (Final) - 

Weakness (Final) - 

Radiculopathy, cervical region (Final) - 

Discharge Disposition: Home or Self Care

Attending Physician: Windy Lopez MD





Vital Signs





No data available for this section



Problem List







    



              Condition     Effective Dates     Status       Health Status     Informant

 

    



                           Chest                     Active  



                                         pain(Confirmed)    

 

    



                           HLD -                     Active  



                                         Hyperlipidemia(Confi    



                                         rmed)    

 

    



                           HTN                       Resolved  



                                         (hypertension)(Confi    



                                         rmed)    

 

    



                           HTN -                     Active  



                                         Hypertension(Confirm    



                                         ed)    

 

    



                           Hypercholesterolemia      Resolved  



                                         (Confirmed)    







Allergies, Adverse Reactions, Alerts







   



                 Substance       Reaction        Severity        Status

 

   



                           cipro                     Active

 

   



                           Tape                      Active







Medications





No data available for this section



Results





No data available for this section



Immunizations





Given and Recorded





   



                 Vaccine         Date            Status          Refusal Reason

 

   



                     pneumococcal 23-valent vaccine     11             Given 







Procedures







    



              Procedure     Date         Related Diagnosis     Body Site     Status

 

    



                           Abdominal hysterectomy        Completed

 

    



                           Appendectomy              Completed

 

    



                           Cataract surgery1         Completed

 

    



                           Cholecystectomy           Completed

 

    



                           Kidney biopsy2            Completed

 

    



                           Replacement of electronic stimulator into        Completed



                                         bladder3    

 

    



                           Suspension of bladder4        Completed

 

    



                           Tonsillectomy             Completed







1bilat



2removal of kidney stones



3Placed in left hip



4x3



Social History





No data available for this section



Assessment and Plan





No data available for this section

## 2018-10-16 NOTE — XMS REPORT
Summary of Care: 16 - 16

                             Created on: 2095



VERONICA LEAVITT

External Reference #: [id number]

: 1940

Sex: Female



Demographics







                          Address                   [address] [place]  [address]-

 

                          Home Phone                [phone]

 

                          Preferred Language        English

 

                          Marital Status            

 

                          Hinduism Affiliation     None

 

                          Race                      White/

 

                          Ethnic Group              Unknown





Author







                          Author                    [facility] [place]

 

                          Address                   Unknown

 

                          Phone                     Unavailable







Encounter





HQ Carl(FIN) [id number] Date(s): 16 - 16

[facility] [place] [address] [place] [address]- [phone]

Discharge Disposition: Home

Attending Physician: Shukri Hyde MD





Vital Signs





No data available for this section



Problem List







    



              Condition     Effective Dates     Status       Health Status     Informant

 

    



                           Chest                     Active  



                                         pain(Confirmed)    

 

    



                           HLD -                     Active  



                                         Hyperlipidemia(Confi    



                                         rmed)    

 

    



                           HTN                       Resolved  



                                         (hypertension)(Confi    



                                         rmed)    

 

    



                           HTN -                     Active  



                                         Hypertension(Confirm    



                                         ed)    

 

    



                           Hypercholesterolemia      Resolved  



                                         (Confirmed)    

 

    



                           Implant(Confirmed)1       Active  







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



Allergies, Adverse Reactions, Alerts







   



                 Substance       Reaction        Severity        Status

 

   



                           cipro                     Active

 

   



                           Tape                      Active







Medications





No data available for this section



Results





No data available for this section



Immunizations







  



                     Vaccine             Date                Refusal Reason

 

  



                           pneumococcal 23-valent vaccine     11 







Procedures







   



                 Procedure       Date            Related Diagnosis     Body Site

 

   



                                         Abdominal hysterectomy   

 

   



                                         Appendectomy   

 

   



                                         Cataract surgery1   

 

   



                                         Cholecystectomy   

 

   



                                         Kidney biopsy2   

 

   



                                         Replacement of electronic stimulator into   



                                         bladder3   

 

   



                                         Suspension of bladder4   

 

   



                                         Tonsillectomy   







1bilat



2removal of kidney stones



3Placed in left hip



4x3



Social History





No data available for this section



Assessment and Plan





No data available for this section

## 2018-10-16 NOTE — XMS REPORT
Summary of Care: 17 - 17

                             Created on: 2070



VERONICA LEAVITT

External Reference #: [id number]

: 1940

Sex: Female



Demographics







                          Address                   [address] [place]  [address]-

 

                          Home Phone                [phone]

 

                          Preferred Language        English

 

                          Marital Status            

 

                          Scientology Affiliation     None

 

                          Race                      White/

 

                          Ethnic Group              Unknown





Author







                          Author                    [facility] [place]

 

                          Address                   Unknown

 

                          Phone                     Unavailable







Encounter





HQ Encntr_kaylan(FIN) [id number] Date(s): 17 - 17

[facility] [place] [address] [facility] [address] [place], [place] [address]- [phone]

Discharge Disposition: Home or Self Care

Attending Physician: Michael Elaine MD





Vital Signs





No data available for this section



Problem List







    



              Condition     Effective Dates     Status       Health Status     Informant

 

    



                           Chest                     Active  



                                         pain(Confirmed)    

 

    



                           HLD -                     Active  



                                         Hyperlipidemia(Confi    



                                         rmed)    

 

    



                           HTN                       Resolved  



                                         (hypertension)(Confi    



                                         rmed)    

 

    



                           HTN -                     Active  



                                         Hypertension(Confirm    



                                         ed)    

 

    



                           Hypercholesterolemia      Resolved  



                                         (Confirmed)    

 

    



                           Implant(Confirmed)1       Active  







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



Allergies, Adverse Reactions, Alerts







   



                 Substance       Reaction        Severity        Status

 

   



                           cipro                     Active

 

   



                           Tape                      Active







Medications





No data available for this section



Results





No data available for this section



Immunizations





Given and Recorded





   



                 Vaccine         Date            Status          Refusal Reason

 

   



                     pneumococcal 23-valent vaccine     11             Given 







Procedures







   



                 Procedure       Date            Related Diagnosis     Body Site

 

   



                                         Abdominal hysterectomy   

 

   



                                         Appendectomy   

 

   



                                         Cataract surgery1   

 

   



                                         Cholecystectomy   

 

   



                                         Kidney biopsy2   

 

   



                                         Replacement of electronic stimulator into   



                                         bladder3   

 

   



                                         Suspension of bladder4   

 

   



                                         Tonsillectomy   







1bilat



2removal of kidney stones



3Placed in left hip



4x3



Social History





No data available for this section



Assessment and Plan





No data available for this section

## 2018-10-16 NOTE — XMS REPORT
Summary of Care: 17 - 17

                             Created on: 2069



VERONICA LEAVITT

External Reference #: [id number]

: 1940

Sex: Female



Demographics







                          Address                   [address] [place]  [address]-

 

                          Home Phone                [phone]

 

                          Preferred Language        English

 

                          Marital Status            

 

                          Shinto Affiliation     None

 

                          Race                      White/

 

                          Ethnic Group              Unknown





Author







                          Author                    [facility]

 

                          Organization              [facility]

 

                          Address                   Unknown

 

                          Phone                     Unavailable







Encounter





HQ Sanjiv_kaylan(FIN) [id number] Date(s): 17 - 17

[facility]

Discharge Disposition: Home or Self Care

Attending Physician: Phil Csaas MD





Vital Signs





No data available for this section



Problem List







    



              Condition     Effective Dates     Status       Health Status     Informant

 

    



                           Chest                     Active  



                                         pain(Confirmed)    

 

    



                           HLD -                     Active  



                                         Hyperlipidemia(Confi    



                                         rmed)    

 

    



                           HTN                       Resolved  



                                         (hypertension)(Confi    



                                         rmed)    

 

    



                           HTN -                     Active  



                                         Hypertension(Confirm    



                                         ed)    

 

    



                           Hypercholesterolemia      Resolved  



                                         (Confirmed)    

 

    



                           Implant(Confirmed)1       Active  







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



Allergies, Adverse Reactions, Alerts







   



                 Substance       Reaction        Severity        Status

 

   



                           cipro                     Active

 

   



                           Tape                      Active







Medications





No data available for this section



Results





No data available for this section



Immunizations





Given and Recorded





   



                 Vaccine         Date            Status          Refusal Reason

 

   



                     pneumococcal 23-valent vaccine     11             Given 







Procedures







   



                 Procedure       Date            Related Diagnosis     Body Site

 

   



                                         Abdominal hysterectomy   

 

   



                                         Appendectomy   

 

   



                                         Cataract surgery1   

 

   



                                         Cholecystectomy   

 

   



                                         Kidney biopsy2   

 

   



                                         Replacement of electronic stimulator into   



                                         bladder3   

 

   



                                         Suspension of bladder4   

 

   



                                         Tonsillectomy   







1bilat



2removal of kidney stones



3Placed in left hip



4x3



Social History





No data available for this section



Assessment and Plan





No data available for this section

## 2018-10-16 NOTE — XMS REPORT
Summary of Care: 17 - 17

                             Created on: 2115



VERONICA LEAVITT

External Reference #: [id number]

: 1940

Sex: Female



Demographics







                          Address                   [address] [place]  [address]-

 

                          Home Phone                [phone]

 

                          Preferred Language        English

 

                          Marital Status            

 

                          Islam Affiliation     None

 

                          Race                      White/

 

                          Ethnic Group              Unknown





Author







                          Author                    [facility]

 

                          Organization              [facility]

 

                          Address                   Unknown

 

                          Phone                     Unavailable







Encounter





HQ Shirar_kaylan(FIN) [id number] Date(s): 17 - 17

[facility]

Discharge Disposition: Home or Self Care

Attending Physician: Phil Casas MD





Vital Signs





No data available for this section



Problem List







    



              Condition     Effective Dates     Status       Health Status     Informant

 

    



                           Chest                     Active  



                                         pain(Confirmed)    

 

    



                           HLD -                     Active  



                                         Hyperlipidemia(Confi    



                                         rmed)    

 

    



                           HTN                       Resolved  



                                         (hypertension)(Confi    



                                         rmed)    

 

    



                           HTN -                     Active  



                                         Hypertension(Confirm    



                                         ed)    

 

    



                           Hypercholesterolemia      Resolved  



                                         (Confirmed)    

 

    



                           Implant(Confirmed)1       Active  







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



Allergies, Adverse Reactions, Alerts







   



                 Substance       Reaction        Severity        Status

 

   



                           cipro                     Active

 

   



                           Tape                      Active







Medications





No data available for this section



Results





No data available for this section



Immunizations





Given and Recorded





   



                 Vaccine         Date            Status          Refusal Reason

 

   



                     pneumococcal 23-valent vaccine     11             Given 







Procedures







   



                 Procedure       Date            Related Diagnosis     Body Site

 

   



                                         Abdominal hysterectomy   

 

   



                                         Appendectomy   

 

   



                                         Cataract surgery1   

 

   



                                         Cholecystectomy   

 

   



                                         Kidney biopsy2   

 

   



                                         Replacement of electronic stimulator into   



                                         bladder3   

 

   



                                         Suspension of bladder4   

 

   



                                         Tonsillectomy   







1bilat



2removal of kidney stones



3Placed in left hip



4x3



Social History





No data available for this section



Assessment and Plan





No data available for this section

## 2018-10-16 NOTE — XMS REPORT
Summary of Care: 14 - 14

                             Created on: 10/25/2100



VERONICA LEAVITT

External Reference #: [id number]

: 1940

Sex: Female



Demographics







                          Address                   [address] [place]  [address]-

 

                          Home Phone                [phone]

 

                          Preferred Language        English

 

                          Marital Status            

 

                          Scientologist Affiliation     None

 

                          Race                      White/

 

                          Ethnic Group              Other





Author







                          Organization              Unknown

 

                          Address                   Unknown

 

                          Phone                     Unavailable







Encounter





HQ Sanjiv_kaylan(FIN) [id number] Date(s): 14 - 14

[facility] [place] [address] [address] [phone]

Discharge Disposition: Home

Physician Attending: Anson Larios MD





Reason for Visit





V13.01 - PRSNL HST URNR



Problem List







    



              Condition     Effective Dates     Status       Health Status     Informant

 

    



                           Chest                     Active  



                                         pain(Confirmed)    

 

    



                           HLD -                     Active  



                                         Hyperlipidemia(Confi    



                                         rmed)    

 

    



                           HTN                       Resolved  



                                         (hypertension)(Confi    



                                         rmed)    

 

    



                           HTN -                     Active  



                                         Hypertension(Confirm    



                                         ed)    

 

    



                           Hypercholesterolemia      Resolved  



                                         (Confirmed)    

 

    



                           Implant(Confirmed)1       Active  







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



Allergies, Adverse Reactions, Alerts







   



                 Substance       Reaction        Severity        Status

 

   



                           cipro                     Active

 

   



                           Tape                      Active







Medications





No data available for this section



Medications Administered During Your Visit





No data available for this section



Immunizations







  



                     Vaccine             Date                Refusal Reason

 

  



                           pneumococcal 23-valent vaccine     11

## 2018-10-16 NOTE — XMS REPORT
Summary of Care: 3/13/17 - 17

                             Created on: 2095



VERONICA LEAVITT

External Reference #: [id number]

: 1940

Sex: Female



Demographics







                          Address                   [address] [place]  [address]-

 

                          Home Phone                [phone]

 

                          Preferred Language        English

 

                          Marital Status            

 

                          Yazidi Affiliation     None

 

                          Race                      White/

 

                          Ethnic Group              Non-





Author







                          Author                    [facility]

 

                          Organization              [facility]

 

                          Address                   Unknown

 

                          Phone                     Unavailable







Encounter





HQ Encntr_kaylan(FIN) [id number] Date(s): 3/13/17 - 17

[facility]

Discharge Disposition: Home or Self Care

Attending Physician: Phil Casas MD





Vital Signs





No data available for this section



Problem List







    



              Condition     Effective Dates     Status       Health Status     Informant

 

    



                           Chest                     Active  



                                         pain(Confirmed)    

 

    



                           HLD -                     Active  



                                         Hyperlipidemia(Confi    



                                         rmed)    

 

    



                           HTN                       Resolved  



                                         (hypertension)(Confi    



                                         rmed)    

 

    



                           HTN -                     Active  



                                         Hypertension(Confirm    



                                         ed)    

 

    



                           Hypercholesterolemia      Resolved  



                                         (Confirmed)    

 

    



                           Implant(Confirmed)1       Active  







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



Allergies, Adverse Reactions, Alerts







   



                 Substance       Reaction        Severity        Status

 

   



                           cipro                     Active

 

   



                           Tape                      Active







Medications





No data available for this section



Results





No data available for this section



Immunizations





Given and Recorded





   



                 Vaccine         Date            Status          Refusal Reason

 

   



                     pneumococcal 23-valent vaccine     11             Given 







Procedures







   



                 Procedure       Date            Related Diagnosis     Body Site

 

   



                                         Abdominal hysterectomy   

 

   



                                         Appendectomy   

 

   



                                         Cataract surgery1   

 

   



                                         Cholecystectomy   

 

   



                                         Kidney biopsy2   

 

   



                                         Replacement of electronic stimulator into   



                                         bladder3   

 

   



                                         Suspension of bladder4   

 

   



                                         Tonsillectomy   







1bilat



2removal of kidney stones



3Placed in left hip



4x3



Social History





No data available for this section



Assessment and Plan





No data available for this section

## 2018-10-16 NOTE — XMS REPORT
Summary of Care: 14 - 14

                             Created on: 2100



VERONICA LEAVITT

External Reference #: [id number]

: 1940

Sex: Female



Demographics







                          Address                   [address] [place]  [address]-

 

                          Home Phone                [phone]

 

                          Preferred Language        English

 

                          Marital Status            

 

                          Faith Affiliation     None

 

                          Race                      White/

 

                          Ethnic Group              Other





Author







                          Organization              Unknown

 

                          Address                   Unknown

 

                          Phone                     Unavailable







Encounter





TIAGO Henry(PALMIRA) [id number] Date(s): 14 - 14

[facility] [address] [address]

Discharge Diagnosis: Fall

Discharge Diagnosis: Neck pain

Discharge Disposition: Home

Physician Attending: Lm Salcedo MD





Reason for Visit





FALL



Vital Signs







  



                     Most recent to      1                   2



                                         oldest [Reference  



                                         Range]:  

 

  



                           Height                    160.02 cm 



                                         (14 5:55 PM) 

 

  



                     Temperature Oral     98.0 DegF           98.2 DegF



                     [96.4-99.1 DegF]     (14 8:44 PM)     (14 5:55 PM)

 

  



                     Systolic Blood      138 mmHg            158 mmHg



                     Pressure [     (14 8:44 PM)     *HI*



                           mmHg]                     (14 5:55 PM)

 

  



                     Diastolic Blood     79 mmHg             81 mmHg



                     Pressure [60-90     (14 8:44 PM)     (14 5:55 PM)



                                         mmHg]  

 

  



                     Respiratory Rate     18 BRMIN            20 BRMIN



                     [14-20 BRMIN]       (14 8:44 PM)     (14 5:55 PM)

 

  



                     Peripheral Pulse     86 bpm              87 bpm



                     Rate [ bpm]     (14 8:44 PM)     (14 5:55 PM)

 

  



                           Weight                    81.818 kg 



                                         (14 5:55 PM) 

 

  



                           Body Mass Index           31.95 m2 



                                         (14 5:55 PM) 







Problem List







    



              Condition     Effective Dates     Status       Health Status     Informant

 

    



                           Chest                     Active  



                                         pain(Confirmed)    

 

    



                           HLD -                     Active  



                                         Hyperlipidemia(Confi    



                                         rmed)    

 

    



                           HTN                       Resolved  



                                         (hypertension)(Confi    



                                         rmed)    

 

    



                           HTN -                     Active  



                                         Hypertension(Confirm    



                                         ed)    

 

    



                           Hypercholesterolemia      Resolved  



                                         (Confirmed)    

 

    



                           Implant(Confirmed)1       Active  







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



Allergies, Adverse Reactions, Alerts







   



                 Substance       Reaction        Severity        Status

 

   



                           cipro                     Active

 

   



                           Tape                      Active







Medications





Flexeril

10 mg, Route: PO, ONCE, Dosing Weight 81.818, kg, Priority: STAT, Start date:  19:43:00, Stop date: 14 19:43:00

Start Date: 14

Stop Date: 14

Status: Completed



Flexeril 10 mg oral tablet

10 mg=1 tab, PO, TID, for spasm, # 30 tab, 0 Refill(s)

Start Date: 14

Status: Ordered



Medications Administered During Your Visit





No data available for this section



Immunizations







  



                     Vaccine             Date                Refusal Reason

 

  



                           pneumococcal 23-valent vaccine     11

## 2018-10-16 NOTE — XMS REPORT
Summary of Care: 10/17/14 - 10/17/14

                             Created on: 2079



VERONICA LEAVITT

External Reference #: [id number]

: 1940

Sex: Female



Demographics







                          Address                   [address] [place]  [address]-

 

                          Home Phone                [phone]

 

                          Preferred Language        English

 

                          Marital Status            

 

                          Hinduism Affiliation     None

 

                          Race                      White/

 

                          Ethnic Group              Other





Author







                          Organization              Unknown

 

                          Address                   Unknown

 

                          Phone                     Unavailable







Encounter





HQ Carl(FIN) [id number] Date(s): 10/17/14 - 10/17/14

[facility] [place] [address] [place], [place] [address] [phone]

Discharge Disposition: Home

Physician Attending: Michael Elaine MD





Reason for Visit





V76.11 - SCREEN MAMMOGRA



Problem List







    



              Condition     Effective Dates     Status       Health Status     Informant

 

    



                           Chest                     Active  



                                         pain(Confirmed)    

 

    



                           HLD -                     Active  



                                         Hyperlipidemia(Confi    



                                         rmed)    

 

    



                           HTN                       Resolved  



                                         (hypertension)(Confi    



                                         rmed)    

 

    



                           HTN -                     Active  



                                         Hypertension(Confirm    



                                         ed)    

 

    



                           Hypercholesterolemia      Resolved  



                                         (Confirmed)    

 

    



                           Implant(Confirmed)1       Active  







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



Allergies, Adverse Reactions, Alerts







   



                 Substance       Reaction        Severity        Status

 

   



                           cipro                     Active

 

   



                           Tape                      Active







Medications





No data available for this section



Medications Administered During Your Visit





No data available for this section



Immunizations







  



                     Vaccine             Date                Refusal Reason

 

  



                           pneumococcal 23-valent vaccine     11

## 2018-11-13 NOTE — OPERATIVE REPORT
DATE OF PROCEDURE:  October 10, 2018 

 

PREOPERATIVE DIAGNOSES

1.  Refractory urge incontinence.

2.  Urinary tract infections.



POSTOPERATIVE DIAGNOSES

1.  Refractory urge incontinence.

2.  Urinary tract infections.

3.  Grade 2 cystocele.

4.  Mild rectocele.

5.  Atrophic (senile) vaginitis.



OPERATIONS PERFORMED

1.  Cystourethroscopy with bilateral ureteral catheterization and 

retrograde ureteropyelography (separate procedure performed for the urinary 

tract infections).

2.  Interpretation of retrograde ureteropyelography.

3.  Supervision of fluoroscopy.  No radiologist present.

4.  Cystourethroscopy with intravesical injection of Botox.

5.  Pelvic examination under anesthesia.



ANESTHESIA:  General.



COMPLICATIONS:  None.



CLINICAL SUMMARY:  Chantel Mcginnis is a 78-year-old woman with an extensive 

urological history.  The patient has had a history of bilateral 

nephrolithiasis.  She is status post a right nephrolithotomy.  The patient 

has a history of having a left InterStim implant in place and she requested 

that this be removed.  She is aware of the risks of bleeding, infection, 

injury to adjacent structures, need for additional procedures, and elected 

to proceed.



OPERATIVE PROCEDURE IN DETAIL:  Informed consent was verified.  Chantel Mcginnis was properly identified, taken to the operating room, placed on the 

cystoscopy table in supine position.  Anesthesia was uneventfully begun.  

The patient was then carefully and gently re-positioned in the dorsal 

lithotomy position with all pressure points well padded.  Her genitalia 

were prepared and draped in the usual sterile fashion.



A 22.5-Danish cystoscope sheath with the obturator in place was 

atraumatically inserted in the patient's urethra and the bladder was 

drained.  Panendoscopy of the urinary bladder revealed no suspicious 

mucosal lesions, no tumors, no stones, and no diverticula.  Normally 

positioned ad configured ureteral orifices were identified.



A ureteral catheter was used to cannulate each ureter and retrograde 

ureteropyelograms were performed.



Interpretation of retrograde ureteropyelography:  Contrast was instilled in 

retrograde fashion bilaterally.  The left side was unremarkable.  There 

were no tumors, no stones, no diverticula.  Unobstructed drainage was 

observed.  The right hand side exhibited ptosis of the right kidney with 

hydronephrosis also present.  There are signs of prior back surgery with a 

significant amount of hardware present and there are the contacts from the 

InterStim lead that the patient has retained due to the fact that when we 

pulled out the InterStim lead, the lead  from these contact 

points.



Botox was dissolved in sterile saline.  It was then injected in 1-mL 

aliquots in an even distribution in the supratrigonal bladder.  The 

patient's bladder was then drained.



Pelvic examination under anesthesia revealed a grade 2 cystocele and grade 

1 rectocele.  No abnormal palpable pelvic masses could be appreciated.  

There was atrophic (senile) vaginitis.



The patient was then uneventfully reversed from anesthesia and taken to 

recovery room in stable condition.  There were no complications to the 

procedure.  She tolerated the procedure well.



Explicit postop instructions were given.  We will follow the patient up on 

a long-term basis.  Additional followup of course will be performed for the 

patient's numerous anatomical abnormalities that were noted.









DD:  11/13/2018 00:00

DT:  11/13/2018 03:28

Job#:  [id number]

## 2019-07-31 LAB
ANION GAP SERPL CALC-SCNC: 14.3 MMOL/L (ref 8–16)
BASOPHILS # BLD AUTO: 0.1 10*3/UL (ref 0–0.1)
BASOPHILS NFR BLD AUTO: 0.9 % (ref 0–1)
BUN SERPL-MCNC: 23 MG/DL (ref 7–26)
BUN/CREAT SERPL: 24 (ref 6–25)
CALCIUM SERPL-MCNC: 9.6 MG/DL (ref 8.4–10.2)
CHLORIDE SERPL-SCNC: 107 MMOL/L (ref 98–107)
CO2 SERPL-SCNC: 25 MMOL/L (ref 22–29)
DEPRECATED APTT PLAS QN: 27.4 SECONDS (ref 23.8–35.5)
DEPRECATED INR PLAS: 0.82
DEPRECATED NEUTROPHILS # BLD AUTO: 4.7 10*3/UL (ref 2.1–6.9)
EGFRCR SERPLBLD CKD-EPI 2021: 58 ML/MIN (ref 60–?)
EOSINOPHIL # BLD AUTO: 0.1 10*3/UL (ref 0–0.4)
EOSINOPHIL NFR BLD AUTO: 1.5 % (ref 0–6)
ERYTHROCYTE [DISTWIDTH] IN CORD BLOOD: 14.2 % (ref 11.7–14.4)
GLUCOSE SERPLBLD-MCNC: 94 MG/DL (ref 74–118)
HCT VFR BLD AUTO: 41.5 % (ref 34.2–44.1)
HGB BLD-MCNC: 13.4 G/DL (ref 12–16)
LYMPHOCYTES # BLD: 1.5 10*3/UL (ref 1–3.2)
LYMPHOCYTES NFR BLD AUTO: 21.5 % (ref 18–39.1)
MCH RBC QN AUTO: 30.7 PG (ref 28–32)
MCHC RBC AUTO-ENTMCNC: 32.3 G/DL (ref 31–35)
MCV RBC AUTO: 95 FL (ref 81–99)
MONOCYTES # BLD AUTO: 0.5 10*3/UL (ref 0.2–0.8)
MONOCYTES NFR BLD AUTO: 7.7 % (ref 4.4–11.3)
NEUTS SEG NFR BLD AUTO: 68.1 % (ref 38.7–80)
PLATELET # BLD AUTO: 169 X10E3/UL (ref 140–360)
POTASSIUM SERPL-SCNC: 4.3 MMOL/L (ref 3.5–5.1)
PROTHROMBIN TIME: 11.8 SECONDS (ref 11.9–14.5)
RBC # BLD AUTO: 4.37 X10E6/UL (ref 3.6–5.1)
SODIUM SERPL-SCNC: 142 MMOL/L (ref 136–145)

## 2019-07-31 NOTE — DIAGNOSTIC IMAGING REPORT
EXAMINATION:  CHEST 2 VIEWS    



INDICATION: Pre-operative



COMPARISON: Chest radiograph of 10/9/2018

     

FINDINGS:



LINES/TUBES:None



LUNGS:The lungs are well-inflated. No focal consolidation or pulmonary edema.

Scattered bibasilar calcified granulomas.



PLEURA:No pleural effusion or pneumothorax.



MEDIASTINUM:The cardiomediastinal silhouette appears normal in size and shape.

Atherosclerotic calcifications of the thoracic aorta.



BONES/SOFT TISSUES:No acute osseous injury. Mild degenerative changes of the

thoracic spine. Postoperative changes of hemiarthroplasty of both shoulders.

Anterior cervical spine fusion hardware present.



ABDOMEN:No free air under the diaphragm. Status post cholecystectomy.



IMPRESSION: 

No focal pneumonia or pulmonary edema.



Signed by: Zofia Carmona MD on 7/31/2019 1:03 PM

## 2019-08-02 ENCOUNTER — HOSPITAL ENCOUNTER (OUTPATIENT)
Dept: HOSPITAL 88 - OR | Age: 79
Discharge: HOME | End: 2019-08-02
Attending: UROLOGY
Payer: MEDICARE

## 2019-08-02 VITALS — DIASTOLIC BLOOD PRESSURE: 54 MMHG | SYSTOLIC BLOOD PRESSURE: 120 MMHG

## 2019-08-02 DIAGNOSIS — N81.6: ICD-10-CM

## 2019-08-02 DIAGNOSIS — N81.10: ICD-10-CM

## 2019-08-02 DIAGNOSIS — Z85.3: ICD-10-CM

## 2019-08-02 DIAGNOSIS — M19.90: ICD-10-CM

## 2019-08-02 DIAGNOSIS — F32.9: ICD-10-CM

## 2019-08-02 DIAGNOSIS — Z01.818: ICD-10-CM

## 2019-08-02 DIAGNOSIS — Z88.2: ICD-10-CM

## 2019-08-02 DIAGNOSIS — N13.30: ICD-10-CM

## 2019-08-02 DIAGNOSIS — N18.9: ICD-10-CM

## 2019-08-02 DIAGNOSIS — I12.9: ICD-10-CM

## 2019-08-02 DIAGNOSIS — N39.0: ICD-10-CM

## 2019-08-02 DIAGNOSIS — Z86.73: ICD-10-CM

## 2019-08-02 DIAGNOSIS — Z88.8: ICD-10-CM

## 2019-08-02 DIAGNOSIS — Z79.82: ICD-10-CM

## 2019-08-02 DIAGNOSIS — N39.41: Primary | ICD-10-CM

## 2019-08-02 DIAGNOSIS — Z88.1: ICD-10-CM

## 2019-08-02 DIAGNOSIS — Z01.812: ICD-10-CM

## 2019-08-02 DIAGNOSIS — N95.2: ICD-10-CM

## 2019-08-02 PROCEDURE — 93005 ELECTROCARDIOGRAM TRACING: CPT

## 2019-08-02 PROCEDURE — 36415 COLL VENOUS BLD VENIPUNCTURE: CPT

## 2019-08-02 PROCEDURE — 71046 X-RAY EXAM CHEST 2 VIEWS: CPT

## 2019-08-02 PROCEDURE — 85610 PROTHROMBIN TIME: CPT

## 2019-08-02 PROCEDURE — 80048 BASIC METABOLIC PNL TOTAL CA: CPT

## 2019-08-02 PROCEDURE — 87086 URINE CULTURE/COLONY COUNT: CPT

## 2019-08-02 PROCEDURE — 52351 CYSTOURETERO & OR PYELOSCOPE: CPT

## 2019-08-02 PROCEDURE — 85730 THROMBOPLASTIN TIME PARTIAL: CPT

## 2019-08-02 PROCEDURE — 74420 UROGRAPHY RTRGR +-KUB: CPT

## 2019-08-02 PROCEDURE — 52287 CYSTOSCOPY CHEMODENERVATION: CPT

## 2019-08-02 PROCEDURE — 87186 SC STD MICRODIL/AGAR DIL: CPT

## 2019-08-02 PROCEDURE — 85025 COMPLETE CBC W/AUTO DIFF WBC: CPT

## 2019-08-02 NOTE — XMS REPORT
Summary of Care: 19 - 19

                             Created on: 2078



VERONICA LEAVITT

External Reference #: [id number]

: 1940

Sex: Female



Demographics







                          Address                   [address] [place]  [address]-

 

                          Home Phone                [phone]

 

                          Preferred Language        English

 

                          Marital Status            

 

                          Moravian Affiliation     None

 

                          Race                      White

 

                                        Additional Race(s)  

 

                          Ethnic Group              Unknown





Author







                          Author                    [facility]

 

                          Organization              [facility]

 

                          Address                   Unknown

 

                          Phone                     Unavailable







Encounter





HQ Encntr_kaylan(FIN) [id number] Date(s): 19 - 19

[facility]

Discharge Disposition: Home or Self Care

Attending Physician: Phil Casas MD





Vital Signs





No data available for this section



Problem List







    



              Condition     Effective Dates     Status       Health Status     Informant

 

    



                           Chest                     Active  



                                         pain(Confirmed)    

 

    



                           HLD -                     Active  



                                         Hyperlipidemia(Confi    



                                         rmed)    

 

    



                           HTN                       Resolved  



                                         (hypertension)(Confi    



                                         rmed)    

 

    



                           HTN -                     Active  



                                         Hypertension(Confirm    



                                         ed)    

 

    



                           Hypercholesterolemia      Resolved  



                                         (Confirmed)    







Allergies, Adverse Reactions, Alerts







   



                 Substance       Reaction        Severity        Status

 

   



                           cipro                     Active

 

   



                           Tape                      Active







Medications





No data available for this section



Results





No data available for this section



Immunizations





Given and Recorded





   



                 Vaccine         Date            Status          Refusal Reason

 

   



                     pneumococcal 23-valent vaccine     11             Given 







Procedures







    



              Procedure     Date         Related Diagnosis     Body Site     Status

 

    



                           Abdominal hysterectomy        Completed

 

    



                           Appendectomy              Completed

 

    



                           Cataract surgery1         Completed

 

    



                           Cholecystectomy           Completed

 

    



                           Kidney biopsy2            Completed

 

    



                           Replacement of electronic stimulator into        Completed



                                         bladder3    

 

    



                           Suspension of bladder4        Completed

 

    



                           Tonsillectomy             Completed







1bilat



2removal of kidney stones



3Placed in left hip



4x3



Social History





No data available for this section



Assessment and Plan





No data available for this section

## 2019-08-02 NOTE — XMS REPORT
Summary of Care: 10/11/18 - 10/11/18

                             Created on: 2057



VERONICA LEAVITT

External Reference #: [id number]

: 1940

Sex: Female



Demographics







                          Address                   [address] [place]  [address]-

 

                          Home Phone                [phone]

 

                          Preferred Language        English

 

                          Marital Status            

 

                          Hinduism Affiliation     None

 

                          Race                      White

 

                                        Additional Race(s)  

 

                          Ethnic Group              Non-





Author







                          Author                    [facility] [place]

 

                          Address                   Unknown

 

                          Phone                     Unavailable







Encounter





HQ Carl(FIN) [id number] Date(s): 10/11/18 - 10/11/18

[facility] [place] [address] [facility] [address] [place], 
46- [phone]

Encounter Diagnosis

Incomplete rotator cuff tear or rupture of left shoulder, not specified as traum
atic (Final) - 10/16/18

Primary osteoarthritis, left shoulder (Final) - 

Other sprain of left shoulder joint, initial encounter (Final) - 

Calculus of kidney (Final) - 

Calculus in bladder (Final) - 

Cyst of kidney, acquired (Final) - 

Fatty (change of) liver, not elsewhere classified (Final) - 

Diverticulosis of intestine, part unspecified, without perforation or abscess wi
thout bleeding (Final) - 

Discharge Disposition: Home or Self Care

Attending Physician: Phil Casas MD

Referring Physician: Anson Larios MD





Vital Signs





No data available for this section



Problem List







    



              Condition     Effective Dates     Status       Health Status     Informant

 

    



                           Chest                     Active  



                                         pain(Confirmed)    

 

    



                           HLD -                     Active  



                                         Hyperlipidemia(Confi    



                                         rmed)    

 

    



                           HTN                       Resolved  



                                         (hypertension)(Confi    



                                         rmed)    

 

    



                           HTN -                     Active  



                                         Hypertension(Confirm    



                                         ed)    

 

    



                           Hypercholesterolemia      Resolved  



                                         (Confirmed)    







Allergies, Adverse Reactions, Alerts







   



                 Substance       Reaction        Severity        Status

 

   



                           cipro                     Active

 

   



                           Tape                      Active







Medications





No data available for this section



Results





No data available for this section



Immunizations





Given and Recorded





   



                 Vaccine         Date            Status          Refusal Reason

 

   



                     pneumococcal 23-valent vaccine     11             Given 







Procedures







    



              Procedure     Date         Related Diagnosis     Body Site     Status

 

    



                           Abdominal hysterectomy        Completed

 

    



                           Appendectomy              Completed

 

    



                           Cataract surgery1         Completed

 

    



                           Cholecystectomy           Completed

 

    



                           Kidney biopsy2            Completed

 

    



                           Replacement of electronic stimulator into        Completed



                                         bladder3    

 

    



                           Suspension of bladder4        Completed

 

    



                           Tonsillectomy             Completed







1bilat



2removal of kidney stones



3Placed in left hip



4x3



Social History





No data available for this section



Assessment and Plan





No data available for this section

## 2019-08-02 NOTE — XMS REPORT
Summary of Care: 19 - 19

                             Created on: 2044



VERONICA LEAVITT

External Reference #: [id number]

: 1940

Sex: Female



Demographics







                          Address                   [address] [place]  [address]-

 

                          Home Phone                [phone]

 

                          Preferred Language        English

 

                          Marital Status            

 

                          Oriental orthodox Affiliation     None

 

                          Race                      White

 

                                        Additional Race(s)  

 

                          Ethnic Group              Unknown





Author







                          Author                    [facility]

 

                          Organization              [id number]

 

                          Address                   Unknown

 

                          Phone                     Unavailable







Encounter





HQ Encntr_kaylan(FIN) [id number] Date(s): 19 - 19

[facility]

Discharge Disposition: Home or Self Care

Attending Physician: Phil Casas MD





Vital Signs





No data available for this section



Problem List







    



              Condition     Effective Dates     Status       Health Status     Informant

 

    



                           Chest                     Active  



                                         pain(Confirmed)    

 

    



                           HLD -                     Active  



                                         Hyperlipidemia(Confi    



                                         rmed)    

 

    



                           HTN                       Resolved  



                                         (hypertension)(Confi    



                                         rmed)    

 

    



                           HTN -                     Active  



                                         Hypertension(Confirm    



                                         ed)    

 

    



                           Hypercholesterolemia      Resolved  



                                         (Confirmed)    







Allergies, Adverse Reactions, Alerts







   



                 Substance       Reaction        Severity        Status

 

   



                           cipro                     Active

 

   



                           Tape                      Active







Medications





No data available for this section



Results





No data available for this section



Immunizations





Given and Recorded





   



                 Vaccine         Date            Status          Refusal Reason

 

   



                     pneumococcal 23-valent vaccine     11             Given 







Procedures







    



              Procedure     Date         Related Diagnosis     Body Site     Status

 

    



                           Abdominal hysterectomy        Completed

 

    



                           Appendectomy              Completed

 

    



                           Cataract surgery1         Completed

 

    



                           Cholecystectomy           Completed

 

    



                           Kidney biopsy2            Completed

 

    



                           Replacement of electronic stimulator into        Completed



                                         bladder3    

 

    



                           Suspension of bladder4        Completed

 

    



                           Tonsillectomy             Completed







1bilat



2removal of kidney stones



3Placed in left hip



4x3



Social History





No data available for this section



Assessment and Plan





No data available for this section

## 2019-08-02 NOTE — XMS REPORT
Continuity of Care Document

                             Created on: 2019



VERONICA LEAVITT

External Reference #: [id number]

: 1940

Sex: Female



Demographics







                          Address                   [address] [place]  [address]

 

                          Home Phone                [phone]

 

                          Preferred Language        English

 

                          Marital Status            Unknown

 

                          Jehovah's witness Affiliation     Unknown

 

                          Race                      Unknown

 

                          Ethnic Group              Unknown





Author







                          Author                    [employer]

 

                          Address                   Unknown

 

                          Phone                     Unavailable







Care Team Providers







                    Care Team Member Name    Role                Phone

 

                    [employer]    Unavailable         Unavailable



                                    



Problems

                    





                    Problem                            Status                            Onset Date     

                          Classification                            Date Reported       

                          Comments                            Source                    

 

                    LT SHOULDER                            Active                            2019 

                                                                                       

                                        [facility]                    

 

                    LT SHOULDER PAIN                            Active                            2019

                                                                                       

                                        [facility]                    

 

                                        Incomplete rotator cuff tear or rupture of left shoulder, not specified as traumatic

                                                        10/17/2018                   

                                                2019                                        

                                         ABEL Cosme                    

 

                    Unspecified abdominal pain                                                        2018                   

                                                       ABEL Cosme                    

 

                                        Encounter for screening mammogram for malignant neoplasm of breast              

                                                2018                                                        

 AB[facility] Cos[facility]                    

 

                          Postlaminectomy syndrome, not elsewhere classified                                

                          2018                                                        [facility]                    

 

                    CERVICAL PAIN                            Active                            06/15/2017

                                                                                       

                                        [facility]                    

 

                          S/P RT SHOULDER RTC REPAIR                            Active                      

                    2016                                                                       

                                                      [facility]                    

 

                    S/P REVERSE RTC REPAIR                            Active                            

2016                                                                           

                                                      [facility]                    

 

                          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        

                    2016                                                         

                                                       [facility]                  

  

 

                          M79.645 - PAIN IN LEFT FINGER(S)                            Active                

                    10/05/2015                                                                 

                                                       AB[facility] Cos[facility]                   

 

 

                    174.3 BREAST CANCER                            Active                            2015

                                                                                       

                                        [facility]                    

 

                    174.3                            Active                            2015       

                                                                                       

                                        [facility]                    

 

                    BREAST MASS                            Active                            2014 

                                                                                       

                                        [facility]                    

 

                    793.80                            Active                            2014      

                                                                                       

                                        [facility]                    

 

                    Discharge Diagnosis: Fall                                                        2014                   

                                                      [facility]                    

 

                          Discharge Diagnosis: Neck pain                                                    

                    2014       

                                                      [facility]                    

 

                    FALL                            Active                            2014        

                                                                                       

                                        [facility]                    

 

                    HTN (Confirmed)                            Resolved                                 

                          Problem                            10/09/2015                 

                                                       ABEL Cosme, ABEL [place],[facility]

                    

 

                    Chest pain                            Active                                        

                          Problem                            2019                        

                                                       ABEL Cosme, OPICRISS [place], [place],[facility]                    

 

                    HLD - Hyperlipidemia                            Active                              

                          Problem                            2019              

                                                       ABEL Cosme, OPID [place],[facility]                    

 

                    HTN (Confirmed)                            Resolved                                 

                          Problem                            2019                 

                                                       ABEL Cosme,[facility]                    

 

                    HTN - Hypertension                            Active                                

                          Problem                            2019                

                                                       ABEL Cosme, OPID [place],[facility]                    

 

                    Hypercholesterolemia                            Resolved                            

                            Problem                            2019            

                                                       ABEL Cosme, OPID [place],[facility]                    

 

                    Implant1                            Active                                          

                    Problem                            2017                            

Kidney and bladder device that is implanted in the Left hip                     
                                         ABEL Cosme, OPID [place],[facility]    

                

 

                    Cervicalgia                                                                         

                                                2018                                      

                                        [facility]                    

 

                    Weakness                                                                            

                                                2018                                         

                                        [facility]                    

 

                          Radiculopathy, cervical region                                                    

                                                                            2018                 

                                                      [facility]                    

 

                    Calculus of kidney                                                                  

                                                2019                               

                                         [facility] [place]                    

 

                          Other specified disorders of kidney and ureter                                    

                                                                            2018 

                                                        [facility]           

         

 

                    Cyst of kidney, acquired                                                            

                                                      2019                       

                                                       [facility] [place]                    

 

                          Primary osteoarthritis, left shoulder                                             

                                                                            2019          

                                                       [facility]                    



 

                          Other sprain of left shoulder joint, initial encounter                            

                                                                                    2019

                                                         [facility]          

          

 

                    Calculus in bladder                                                                 

                                                2019                              

                                         [facility]                    

 

                          Fatty liver, not elsewhere classified                                             

                                                                            2019          

                                                       [facility] [place]                    



 

                                        Diverticulosis of intestine, part unspecified, without perforation or abscess without

 bleeding                                                                            

                                                2019                                         

                                         [facility]                    

 

                          MAL TAMMI BREAST LOW-INNER                            Active                        

                                                                                                     

                                        [facility]                    

 

                          MALIG NEOPLASM OF LOWER-INNER QUADRANT O                            Active        

                                                                                       

                                                      [facility]                    

 

                          OT ABN AND INCONCLUSIVE FINDINGS ON DX                            Active         

                                                                                        

                                                      [facility]                    



                                                                                
                                                                                
                                                                                
                                                                                
                                                                                
                                                                                
                                                                                
                                                                                
                                



Medications

                    





                    Medication                            Details                            Route      

                          Status                            Patient Instructions         

                          Ordering Provider                            Order Date           

                                        Source                    

 

                                        Cyclobenzaprine hydrochloride 10 MG Oral Tablet [Flexeril]                      

                                        10 mg=1 tab, PO, TID, for spasm, # 30 tab, 0 Refill(s)                       

                                                Active                                                

                                                09/15/2014                            [facility] 

                   

 

                          Flexeril                            10 mg, Route: PO, ONCE, Dosing Weight 81.818, 

kg, Priority: STAT, Start date: 14 19:43:00, Stop date: 14 19:43:00 
                                                       Inactive                      

                                                                            09/15/2014               

                                        [facility]                    



                                                                                
                    



Allergies, Adverse Reactions, Alerts

                    





                    Substance                            Category                            Reaction   

                          Severity                            Reaction type           

                          Status                            Date Reported                     

                          Comments                            Source                    

 

                    cipro                            Assertion                                          

                                                Drug allergy                            Active

                                                                                    [facility]                    

 

                    Tape                            Assertion                                           

                                                      Propensity to adverse reactions to substance

                            Active                                                   

                                                      [facility]                    



                                                                



Immunizations

                    





                    Immunization                            Date Given                            Site  

                          Status                            Last Updated             

                          Comments                            Source                    

 

                          pneumococcal 23-valent vaccine                            2011              

                          Left deltoid                            completed                      

                    H[place]                                                         [facility] [place],[facility]                    



                                             



Results







                                        No Data Provided for This Section



                    



Pathology Reports







                                        No Data Provided for This Section                    



                                                



Diagnostic Reports

                    





                    Report                            Value                            Date             

                                        Source                    

 

                          Abdomen/Pelvis wo IV contrast CT                            Clinical Indication: N20.0

 - CALCULUS OF KIDNEY.    

Comparison: CT abdomen pelvis 2008, 2007

TECHNIQUE: Helical imaging was performed from diaphragm through the symphysis 
with multiplanar reformations obtained.  

IV CONTRAST: None

GI CONTRAST: None

CT imaging performed at this location utilizes radiation dose optimization 
techniques which include one or more of the following:

                                        -Automated exposure control

                                        -Adjustment of the mA and/or kV according to patient size

                                        -Use of iterative reconstruction technique

CT Radiation Dose  mGy-cm

FINDINGS: 

LUNG BASES: There are coronary artery calcifications.

HEPATOBILIARY: The liver is hypodense.  The gallbladder is surgically absent. 

SPLEEN: The spleen is normal in size. Peripherally calcified 1 cm splenic artery
 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

PANCREAS: The pancreas has a grossly normal noncontrast appearance. 

ADRENAL GLANDS: The adrenal glands are normal. 

KIDNEYS: There are punctate 2 mm nonobstructing renal calyceal calculi 
bilaterally. A 3.1 cm cyst is present at the mid to lower pole of the right 
kidney. There is mild right pelvocaliectasis to the level of the ureteropelvic 
junction. No obstructing stone. No perinephric collections. There is bandlike 
scarring in the pararenal fat at the region of the lower pole of the right 
kidney which is similar to multiple prior exams.

BOWEL: Evaluation of the bowel is limited without intravenous or oral contrast. 
There is a small sliding hiatal hernia. The stomach is unremarkable. The bowel 
is normal in caliber.  The appendix is not seen.  Colonic diverticulosis without
 acute inflammatory change.

RETROPERITONEUM/PERITONEUM: There is no free fluid. No free air. 

LYMPH NODES: No adenopathy.  

VASCULATURE: Atheromatous changes are present within the aorta without aneurysm.

PELVIS: There is a small amount of gas within the bladder lumen. There are 
calcifications within the bladder lumen measuring up to 5 mm. The uterus is 
surgically absent.

MUSCULOSKELETAL: There are postsurgical changes of lumbosacral spinal fusion. 
Abandoned sacral stimulator lead is seen on the left.    

IMPRESSION: 

                                        1.  Mild right pelvocaliectasis to the level of the ureteropelvic junction. No obstructing

 stone. There is some scarring in the pararenal fat on the right which may 
involve the UPJ. This is similar to prior exams dating back to .

                                        2.  Bilateral nonobstructing nephrolithiasis

                                        3.  Bladder calculi

                                        4.  Trace gas within the bladder lumen may be related to recent instrumentation.

 Correlate with urinalysis to exclude infection

                                        5.  Right renal cyst

                                        6.  Hepatic steatosis

                                        7.  Diverticulosis

SL:  [id number]

                            10/11/2018                             [facility] [place]

                    

 

                          Shoulder wo contrast MRI                            MR LEFT SHOULDER WITHOUT CONTRAST



HISTORY:  - M75.112   Incomplete rotator cuff tear or rupture of left shoulder, 
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 
78-year-old female reports left shoulder pain and limited range of motion for 
several months

COMPARISON: Left shoulder radiography dated 2018

TECHNIQUE: Axial, oblique coronal, and oblique sagittal MR images of the 
shoulder. 

FINDINGS: 

ROTATOR CUFF:

                                        1. Mild thickening and moderate abnormal signal throughout the supraspinatus insertion

 compatible moderate tendinopathy. No discrete supraspinatus tear.

                                        2. Normal infraspinatus.

                                        3. Normal subscapularis.

                                        4. No rotator cuff muscle atrophy.

                                        5. High riding humeral head which appears secondary to bulky osteophytes of the 

inferior aspect of the glenohumeral joint resulting in impingement of the 
acromial undersurface on the supraspinatus bursal surface fibers.

LABRUM AND BICEPS TENDON:

                                        6. Multifocal degenerative labral tearing.

                                        7. The biceps tendon is intact and located normally within the bicipital groove.



OSSEOUS/ARTICULAR:

                                        8. Mild hypertrophic acromioclavicular arthropathy with mild inferior spurring.

                                        9. Os acromiale is noted, a finding which can be associated with external shoulder

 impingement.

                                        10. Extensive chronic full-thickness cartilage loss of the humeral head and glenoid

 articular surfaces with bulky degenerative spurring at the inferior aspect of 
the glenohumeral joint and subchondral cystic change within the inferior 
glenoid.

                                        11. No fracture, bone contusion, or aggressive osseous lesion.

IMPRESSION:

                                        1. Moderate supraspinatus insertional tendinopathy without a tear.

                                        2. Severe glenohumeral degenerative arthrosis demonstrating extensive chronic full-

thickness cartilage loss and bulky degenerative spurring at the inferior aspect 
of the glenohumeral joint and subchondral cystic change within the inferior 
glenoid.

                                        3. High riding humeral head which appears secondary to bulky osteophytes of the 

inferior aspect of the glenohumeral joint resulting in impingement of the 
acromial undersurface on the supraspinatus bursal surface fibers.

                                        4. Os acromiale is noted, a finding which can be associated with external shoulder

 impingement.

                                        5. Mild hypertrophic acromioclavicular arthropathy with mild inferior spurring.

                                        6. Multifocal degenerative tearing of the labrum.





Thank you referring your patient to [facility] and [facility].







SL: [id number]

                            10/11/2018                             [facility]

                    

 

                          Knee 3 views DX                            Study: Left knee, 3 views 

Clinical Indication: M25.562 - ACUTE PAIN

Comparison: None

FINDINGS: Multiple views of the left knee show moderate medial femorotibial 
compartment osteoarthrosis with joint space narrowing and marginal osteophyte 
formation. No acute bony fracture or joint dislocation is seen. No joint 
effusion is noted. Soft tissues are unremarkable.

IMPRESSION: Moderate medial femorotibial compartment osteoarthrosis of the left 
knee.



SL:  [id number]

                            2018                             [facility]

                    

 

                          Shoulder series DX                            Study: Left shoulder, 3 views 

Clinical Indication: M25.512 - ACUTE PAIN

Comparison: None

FINDINGS: Multiple views of the left shoulder show severe glenohumeral joint 
osteoarthrosis with joint space narrowing and prominent marginal osteophyte 
formation. No acute bony fracture or joint dislocation is seen. Mild AC joint 
osteoarthrosis is noted. Fusion hardware in the lower cervical spine is seen. 
Soft tissues are unremarkable.

IMPRESSION: Severe glenohumeral joint osteoarthrosis.



SL:  [id number]

                            2018                             [facility]

                    

 

                          Retroperitoneal Complete US                            EXAM: US RETROPERITONEAL

HISTORY: 77 years year-old Female with N20.0   Calculus of kidney; R10.9   
Unspecified abdominal pain

COMPARISON: US Retroperitoneal 2015

TECHNIQUE:

Multiple longitudinal and transverse real time sonographic images of the kidneys
 and urinary bladder are obtained.

FINDINGS: 

Right kidney:

Size:  10.3 cm.

The kidney is normal in size, shape, contour, and position.  The cortex is 
normal in thickness and the corticomedullary differentiation is maintained. 
Stable mild right pelvocaliectasis. Echogenic focus measuring 4 mm noted in the 
right kidney likely representing a nonobstructing stone. Septated cyst noted in 
inferior pole measuring approximately 2.7 x 2.7 x 2.6 cm, previously 2.7 x 2.2 x
 2.2 cm.

Left kidney:

Size:  10.1 cm.

The kidney is normal in size, shape, contour, and position.  The cortex is 
normal in thickness and the corticomedullary differentiation is maintained. Mild
 left pelviectasis, stable. Cyst noted in the inferior pole measuring 
approximately 1.5 x 1.1 x 1.5 cm.

Bladder:

Partial distension of the urinary bladder with anechoic fluid is noted.  No wall
 thickening or mass is seen. Bilateral bladder jets noted.

Aorta and Inferior Vena Cava:

The visualized abdominal aorta is unremarkable. The iliac bifurcation was not 
well seen due to bowel gas. The intrahepatic IVC is patent.



IMPRESSION:

                                        1. Stable mild bilateral pelviectasis, possibly representing extrarenal pelvises.



                                        2. Bilateral renal cysts.

                                        3. Nonobstructing right intrarenal stone.



SL:  [id number]

                            2018                            [facility]

                    

 

                                        Breast Mammo Scrn ALEX incl CAD MA                         



BILATERAL DIGITAL SCREENING MAMMOGRAM WITH CAD: 2018



CLINICAL: /Routine.  



Current study was evaluated with a Computer Aided Detection (CAD) system.  

COMPARISON:Comparison is made to exams dated:  2016 mammogram, 8/3/2015 
mammogram, 2014 mammogram - [facility], and 10/17/2014 
mammogram - [facility].   

TECHNIQUE: Mammographic views were obtained using digital acquisition. Current 
study was also evaluated with a Computer Aided Detection (CAD) system. 

FINDINGS: 

There are scattered fibroglandular densities in both breasts.  

There are benign calcifications in both breasts.  There also are post operative 
findings in the right breast.  

No significant masses, calcifications, or other findings are seen in either 
breast.  

There has been no significant interval change.



IMPRESSION: BENIGN

RECOMMENDATION:There is no mammographic evidence of malignancy. A 1 year 
screening mammogram is recommended.(2019)   This exam was interpreted at 
[id number] for  [place],  15.  

Professional services are provided by the [facility].

Rupert Narvaez M.D., cm/maria de jesus:2018 11:54:37  

Imaging Technologist(s): Ruby Pitt RT(R)(M), [facility]

letter sent: BI-RADS 1/2  

Mammogram BI-RADS: 2 Benign

                            2018                            [facility]

                    

 

                          Ankle 3 views DX                            REASON FOR EXAM: M25.572, M25.472 - PAIN,

 SWELLING.

COMPARISON: None.

FINDINGS: 3 views of the left ankle. There is mild bimalleolar soft tissue 
swelling. The ankle mortise appears intact. There is a small plantar calcaneal 
enthesophyte. There are small bony spurs at the dorsal aspect of the medial 
cuneiform. There is no demonstrable fracture, dislocation or radiopaque foreign 
body.

IMPRESSION: 

                                        1. Mild bimalleolar soft tissue swelling.

                                        2. No demonstrable acute osseous abnormality of the left ankle.



SL: 16

                            2018                            [facility]

                    

 

                          Spine cervical 2 or 3 view DX                            REASON FOR EXAM: M54.2. Neck

 pain.

COMPARISON: Cervical spine series 3/31/2016.

FINDINGS: AP, lateral, swimmer's and open-mouth views of the cervical spine were
 performed. 4 images are submitted. There are postsurgical changes of anterior 
fusion from C5 through C7. There is no demonstrable hardware malfunction. There 
is mature bony fusion of the vertebral endplates at C5-C6 and C6-C7.

There is straightening of the normal cervical lordosis. There is approximately 1
 to 2 mm anterolisthesis of C2 on C3, 1 to 2 mm anterolisthesis of C3 on C4, 1 
to 2 mm retrolisthesis of C4 on C5 and 3 to 4 mm anterolisthesis of C7 on T1.

There are mild degenerative changes of the atlantodens interval. There are small
 anterior marginal osteophytes at C3 and C4. There is diffuse disc space 
narrowing at C3-C4, C4-C5 and C7-T1. There is uncal vertebral joint hypertrophy 
at C3-C4 and C4-C5. There is multilevel facet arthropathy.

There is no demonstrable fracture, dislocation or prevertebral soft tissue 
swelling.

IMPRESSION: 

                                        1. No significant interval change from 3/31/2016.

                                        2. Status post anterior fusion from C5 through C7.

                                        3. Degenerative changes and multilevel grade 1 listhesis of the cervical spine as

 described above.



SL: 16

                            2017                            [facility]

                    

 

                          Digital Mammo DX Alex MA                            - DIGITAL MAMMO DX ALEX MA

BILATERAL DIGITAL DIAGNOSTIC MAMMOGRAM WITH CAD: 2016

CLINICAL: Fibrocystic Disease

right IDC s/p lumpectomy and XRT.  

Current study was evaluated with a Computer Aided Detection (CAD) system.  

Comparison is made to exams dated:  8/3/2015 mammogram, 2014 mammogram - 
[facility], 10/17/2014 mammogram - [facility], 2012 mammogram, 2010 mammogram and 2008 mammogram - 
[facility].  

There are scattered fibroglandular densities in both breasts.  

The patient is status post lumpectomy right breast.  There are post operative 
and radiation changes in the right breast. 

There are benign calcifications in both breasts.  

No significant masses, calcifications, or other findings are seen in either 
breast.  

There has been no significant interval change. Breast ultrasound was offered to 
the patient but was declined.  

IMPRESSION: BENIGN

The patient is status post lumpectomy right breast.  

There is no mammographic evidence of malignancy.  A 1 year screening mammogram 
is recommended. The results were reviewed with the patient.  



Laila Fuller M.D.          

jt/:2016 13:50:21  

Imaging Technologist: Miguelina Figueroa, [facility]

This exam was dictated and interpreted by [id number] for [facility].  

letter sent: Normal exam  

Mammogram BI-RADS: 2 Benign

                            2016                            [facility]       

             

 

                          Ankle 2 views DX                            Study: Right ankle, 3 views 

Clinical Indication: M79.671, M25.571 / RT. FOOT AND ANKLE PAIN

Comparison: None

FINDINGS: Multiple views of the right ankle show no acute bony fracture or joint
 dislocation. Ankle mortise is congruent. Soft tissues are unremarkable.

IMPRESSION: No acute bony abnormality of the right ankle.



SL:  [id number]

                            2016                            [facility]

                    

 

                          Foot 2 views DX                            Study: Right foot, 2 views 

Clinical Indication: M79.671, M25.571 / RT. FOOT AND ANKLE PAIN

Comparison: None

FINDINGS: 2 views of the right foot show no acute bony fracture, joint 
dislocation, or suspicious osseous lesion. The bones are diffusely 
demineralized. Os navicularis is noted. Mild to moderate midfoot osteoarthrosis 
is seen. Soft tissues are unremarkable.

IMPRESSION:

                                        1. No acute bony abnormality of the right foot.

                                        2. 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



SL:  [id number]

                            2016                             [facility]

                    

 

                                        Shoulder wo contrast MRI                         

EXAM: MRI of the right shoulder without contrast

INDICATION: M75.101   Unspecified rotator cuff tear or rupture of right 
shoulder, not specified as traumatic, right shoulder pain

COMPARISON: Plain films of the right shoulder from 2016

TECHNIQUE: Multiplanar, multisequence magnetic resonance imaging of the right 
shoulder was performed without  the administration of intravenous gadolinium 
contrast. 

FINDINGS: 

Glenohumeral joint: Negative for acute bony fracture or joint dislocation. 
Severe glenohumeral joint osteoarthrosis is seen with joint space narrowing and 
marginal osseous spurring. Large areas of full-thickness cartilage loss 
throughout the anterosuperior glenoid fossa and superior humeral head are seen 
with underlying subchondral cystic changes along the superior humeral head. Mild
 subchondral cystic change along the inferior glenoid is seen. Degenerative tear
 of the superior glenoid labrum is seen. Small to moderate-sized glenohumeral 
joint effusion and synovitis is seen, communicating with the subacromial-
subdeltoid bursa.

Osseous acromion complex: Mesoacromion type os acromiale is seen with trace 
fluid within the synchondrosis. Mild AC joint osteoarthrosis is seen.

Rotator cuff tendons: Mild to moderate infraspinatus tendinosis is seen. There 
is a full-thickness tear of the supraspinatous tendon with associated 
delaminating morphology measuring 1.5 cm AP dimension with 2 cm proximal tendon 
retraction of the torn articular surface fibers and 6 mm proximal tendon 
retraction of the torn bursal surface fibers. Additional bursal surface partial-
thickness tearing of the supraspinatous tendon at the level of the AC joint is 
also seen. Interstitial fluid tracks proximally along the supraspinatus 
myotendinous unit. High-grade articular surface partial-thickness tears of the 
remaining posterior fibers of the supraspinatus tendon are seen. Subscapularis 
and teres minor tendons are intact.

Biceps tendon: Intracapsular biceps tendinosis is seen. No tendon subluxation or
 dislocation is noted. 9 mm loose body within the biceps tendon sheath of the 
level of the proximal humeral metaphysis is seen.

Soft tissues: Moderate atrophy of the supraspinatus muscle belly is seen. Fatty 
infiltration of the infraspinatus muscle belly is also seen.

IMPRESSION:

                                        1. Severe glenohumeral joint osteoarthrosis with small to moderate-sized glenohumeral

 joint effusion and synovitis.

                                        2. Full-thickness tear of the supraspinatus tendon with delaminating morphology 

measuring 1.5 cm AP dimension. There is 2 cm proximal retraction of the torn 
articular surface fibers and 6 mm tendon retraction of the torn bursal surface 
fibers. High-grade articular surface partial-thickness tears of the remaining 
posterior fibers of the supraspinatus tendon are seen. Bursal surface partial-
thickness tearing of the supraspinatus tendon at the level of the AC joint is 
also noted.

                                        3. Mild to moderate infraspinatus tendinosis.

                                        4. Biceps tendinosis with 9 mm loose body within the tendon sheath at the level 

of the proximal humeral metaphysis.

                                        5. Mild AC joint osteoarthrosis with incidentally noted os acromiale.



SL: [id number]

                            2016                             [facility]

                    

 

                          Shoulder series DX                            Patient Name: VERONICA LEAVITT

: 1940; Age: 75 years Female

MR: [id number]

Study: Shoulder series DX 3/31/2016 2:02 PM CDT

Clinical Indication: PAIN IN RIGHT SHOULDER. 

COMPARISON: None

Views and laterality: 2016. 2014.

Examination of the shoulder demonstrates moderate right glenohumeral joint 
osteoarthritic change. Healed right proximal humeral neck fracture. The 
acromioclavicular joint and coracoclavicular spaces are intact. The acromion and
 coracoid processes appear normal.The subacromial space is normal. Stable 6 mm 
cyst in the mid body of the scapula when compared to 2014. The clavicle
 is normal. Lower cervical fusion previously described. Right axillary staples.

If there is further concern, followup radiographs or MRI of the shoulder may be 
performed for complete assessment.



IMPRESSION: 

                                        1.  Right glenohumeral joint osteoarthritic change.

                                        2.  Old healed proximal humeral fracture.

                                        3.  Lower cervical fusion.

                                        4.  No acute process.



SL:  [id number]

                            2016                            [facility]

                    

 

                          Spine cervical series DX                            Study: Cervical spine, 5 views

 

Clinical Indication: CERVICALGIA

Comparison: None

FINDINGS: Multiple views of the cervical spine show visualization through the C7
 vertebral body on the lateral view. Postoperative changes of ACDF from C5 
through C7 are seen. No hardware fracture or displacement is seen. No suspicious
 perihardware paralleling lucency is seen. There is grade 1 anterolisthesis of 
C7 over T1 by 3 mm. Grade 1 anterolisthesis of C3 over C4 by 2 mm is seen. There
 is also grade 1 retrolisthesis of C4 over C5 by 2 mm. Moderate-severe 
multilevel degenerative disc disease at C3-C4, C4-C5, and C7-T1 is seen with 
disc height loss and marginal osteophytes. Moderate-severe facet arthrosis of 
the cervical spine is seen. Moderate-severe multilevel neural foraminal 
narrowing throughout the cervical spine is seen. Odontoid process is intact. 
Prevertebral soft tissues are unremarkable.

IMPRESSION:

                                        1. Postoperative changes of ACDF from C5 through C7 without hardware complication.



                                        2. Moderate to severe multilevel degenerative changes throughout the cervical spine.





SL:  [id number]

                            2016                            [facility]

                    

 

                          Shoulder series DX                            Study: Right shoulder, 2 views 

Clinical Indication: Right shoulder pain

Comparison: Plain films of the right shoulder from 2014

FINDINGS: Multiple views of the right shoulder show a chronic healed humeral 
surgical neck fracture. No acute bony fracture or joint dislocation is seen. 
Moderate-severe glenohumeral joint osteoarthrosis is again noted. Fusion 
hardware in the lower cervical spine is seen. Right axillary surgical clips are 
seen.

IMPRESSION: Moderate-severe glenohumeral joint osteoarthrosis.



SL:  [id number]

                            2016                            [facility]

                    

 

                          Finger 3 views DX                            LEFT FINGER RADIOGRAPH 3 VIEW

 

INDICATION: Left thumb pain

 

COMPARISON: Left hand radiograph 10/06/2015

 

IMPRESSION:

 

No acute bony abnormalities are visualized.

 

There is no significant interval change in mild arthrosis of the first CMC and 
MCP joints, characterized by joint space narrowing and mild osteophytosis.

 

 

 

 

SL: 16

                            2016                            [facility]

                    

 

                          Hand 3 views DX                            Examination: Left hand, 3 views

 

History: PAIN OF LEFT THUMB

 

Comparison: None.

 

Findings: Multiple views of the left hand show no acute bony fracture, joint 
dislocation, or suspicious osseous erosion. The bones are demineralized. There 
is mild osteoarthrosis of the thumb MCP joint with mild joint space narrowing 
and marginal osseous spurring. Severe triscaphe and thumb CMC osteoarthrosis is 
also seen. Soft tissues are unremarkable.

 

IMPRESSION:

                                        1. No acute bony abnormality of the left hand.

                                        2. Severe triscaphe and thumb CMC osteoarthrosis with mild osteoarthrosis of the

 thumb CMC joint.

 

SL:  16

                            10/06/2015                             [facility]

                    

 

                          Breast Complete Alex US                            - BREAST COMPLETE ALEX US

ULTRASOUND OF BOTH BREASTS AND BOTH AXILLA: 8/3/2015

CLINICAL: Pain

h/o right breast CA s/p lumpectomy and XRT.  

Comparison is made to exams dated:  8/3/2015 mammogram, 2014 ultrasound 
biopsy, 2014 ultrasound, 2014 mammogram - [facility], 10/17/2014 mammogram - [facility] and 2012 
mammogram - [facility].  

Color flow and real-time ultrasound of both breasts and both axilla were 
performed.  Gray scale images of the real-time examination were reviewed.   For 
both breasts, all 4 quadrants, the retroareolar region and axilla are evaluated 
in this exam.  

Prior mass right breast at 7 o'clock has been removed.  There is a seroma and a 
lumpectomy cavity right breast at 7 o'clock.  There also is a 1 cm benign 
hyperechoic lipoma left breast at 7 o'clock that is an incidental finding.  

No abnormalities were seen sonographically in either axilla.  

IMPRESSION: BENIGN - FOLLOW-UP RECOMMENDED

There is no sonographic evidence of malignancy.  

There is no mammographic or sonographic abnormality seen in the left breast to 
correspond with the nipple abnormality/inversion.  This likely represents 
physiologic laxity of supporting structures and is unchanged since at least 
2014. The patient states this is unchanged since her last exam.

There is no abnormality seen in the right breast to correspond with the pain 
which likely represents a scar and post treatment changes.

A follow-up mammogram in 6 months is recommended to demonstrate stability. The 
results were reviewed with the patient.  

SUMMARY:

A follow-up right diagnostic mammogram with possible ultrasound in 6 months is 
recommended to demonstrate stability given the patient's history of prior 
lumpectomy.  



Laila maciast/:8/3/2015 15:09:03  

Imaging Technologist: Michele Monzon, [facility]

This exam was dictated and interpreted by [id number] for [facility].  

letter sent: Normal exam  

Ultrasound BI-RADS: 2 Benign

                            2015                            [facility]       

             

 

                          Digital Mammo DX Alex MA                            - DIGITAL MAMMO DX ALEX MA

BILATERAL DIGITAL DIAGNOSTIC MAMMOGRAM WITH CAD: 8/3/2015

CLINICAL: Breast Cancer

right IDC s/p lumpectomy and XRT.  

Current study was evaluated with a Computer Aided Detection (CAD) system.  

Comparison is made to exams dated:  2014 mammogram - [facility], 10/17/2014 mammogram - [facility], 2012 
mammogram, 2010 mammogram and 2008 mammogram - [facility] 
[place].  

There are scattered fibroglandular densities in both breasts.  

The patient is status post lumpectomy right breast with associated post 
operative changes.  

There are benign calcifications in both breasts.  

No significant masses, calcifications, or other findings are seen in either 
breast.  

IMPRESSION: INCOMPLETE: NEEDS ADDITIONAL IMAGING EVALUATION

The patient is status post lumpectomy right breast.  

There is no mammographic abnormality seen in the left breast to correspond with 
the nipple inversion/abnormality, however ultrasound is recommended.  The 
patient has documented prior left nipple inversion in . 

There is no mammographic abnormality seen in the right breast to correspond with
 the pain which likely represents a scar or post-treatment changes, however 
ultrasound is recommended.  



SUMMARY:

Ultrasound will be performed at this time; please see dedicated separate report.
  



Laila Fuller M.D.          

jt/:8/3/2015 15:00:07  

Imaging Technologist: Magda Wen, [facility]

This exam was dictated and interpreted by [id number] for [facility].  

Mammogram BI-RADS: 0 Indeterminate

                            2015                            [facility]       

             

 

                          Retroperitoneal limited US                            RETROPERITIONEAL ULTRASOUND

 

History- renal insufficiency, abnormal labs. 585.3.

 

Noncontrast CT studies of the abdomen of 2008 and 2007 were 
reviewed.

 

TECHNIQUE: The kidneys and bladder were evaluated utilizing dynamic scanning.

 

 

FINDINGS:

There is mild prominence of the right pyelocalyceal system which may represent a
 prominent extra renal pelvis. The findings are similar to the prior CT scan. 
Very mild or early hydronephrosis cannot be completely excluded.  This is of 
uncertain etiology. There is no hydronephrosis on the left.

 

There is a small (2.7 x 2.2 x 2.2 cm) cyst involving the anterior aspect of the 
lower pole of the right kidney. This has increased in size on the CT scan 
2008 the which time it measured approximately 1 cm. There also is a tiny 
nodule within the cyst. Overall, the cyst and benign appearing but since this 
has increased in size and has a small mural nodule, a follow-up study in 
approximately 6 months is suggested. 

 

It is noted the prior CT scan demonstrated a cyst within the left kidney 
laterally which is not well-seen on this study.

 

There are tiny calcifications in the midpolar region of the left kidney and 
lower pole the right kidney which are unchanged and are consistent with stable 
small renal calculi.

 

Renal measurements - Right kidney: 9.0 x 5.3 x 4.5 cm.  Left kidney: 9.9 x 5.4 x
 5.3 cm.

 

Bladder - partially filled and grossly unremarkable.

 

 

CONCLUSION: 

                                        1. Mild prominence of the right pyelocalyceal system. This appears similar to prior

 CT scan of 2008 and probably represents a prominent extra renal pelvis. 
Very mild or early hydronephrosis cannot be excluded.

                                        2. There is no hydronephrosis on the left.

                                        3. The kidneys are normal in size and echogenicity. 

                                        4. 2.7 x 2.2 x 2.2 cm right renal cyst. The cyst has increased in size for a prior

 CT scan of a 2008 and also shows a tiny mural nodule. Is also noted there
 was a left renal cyst on the prior CT scan is not well demonstrated on this 
study. For these reasons and also to follow-up the prominent right pyelocalyceal
 system, a follow-up study in 6 months is suggested.

                                        5. Tiny calcifications within each kidney which are similar to the prior CT scans,

 probable stable calculi.

 

 

 

SL: Andres Lester M.D.

                            2015                            [facility] [place]

                    

 

                          Shoulder series DX                            RIGHT SHOULDER

                                        (2 views)

 

HISTORY: Right shoulder pain. 719.41

 

TECHNIQUE: The right shoulder was evaluated in internal and external rotation. A
 transthoracic view was also obtained.   

 

FINDINGS: 

                                        1. Slight deformity of the right humeral neck probably related to an old healed 

fracture.

 

                                        2. Mild degenerative change involving the glenohumeral joint. There slight narrowing

 of the joint and mild anterior osteophyte formation.

 

                                        3. The acromioclavicular joint is intact.

 

                                        4. There is no evidence of acute fracture, dislocation, or acute change.

 

                                        5. There are no destructive lesions to suggest metastasis.

 

                                        6. Postoperative change involving the right axilla.

 

 

 

Coding:

Shoulder series 

CPT Code:  51645

SL: 12

 

Mac Lester M.D.

                            2014                            [facility]

                    

 

                          Spine lumbar 2 or 3 views DX                            HISTORY: Interstitial cystitis



 

TECHNIQUE: AP and lateral views of the lumbar spine

 

COMPARISON: None

 

FINDINGS:  

 

No acute fracture or dislocation. No suspicious osseous lesion. Moderate 
multilevel degenerative disease is seen, with multilevel facet arthrosis. 
Findings are worse from L4-S1. Changes related to previous posterior fusion seen
 at L4-S1.

 

Thin curvilinear radiopaque density is seen in the projection of S3. Note is 
made of cholecystectomy clips.

 

IMPRESSION:

 

 

                                        1. Moderate multilevel degenerative disc disease.

                                        2. Curvilinear radiopaque density in the projection of the coccyx, correlate with

 patient's stent device.

                            2014                            [facility] 

                   

 

                          Spine sacrum AP/Lat DX                            HISTORY: Cystitis

 

TECHNIQUE: Two views of the sacrum and coccyx

 

COMPARISON: None

 

FINDINGS:  

 

No acute osseous abnormalities. Partial visualization of cervical hardware in 
L4-S1.

 

Curvilinear radiopaque density is seen projecting at S3, correlate with the 
patient's previously inserted stent device.

 

IMPRESSION:

 

Curvilinear radiopaque density seen projecting at S3, correlate with positioning
 of patient's known stent.

                            2014                            [facility] [place] 

                   

 

                          Breast biopsy uni US Guided w clip MA                            - BREAST BIOPSY UNI

 US GUIDED W CLIP MA/R

ULTRASOUND GUIDED BIOPSY RIGHT BREAST WITH MARKING DEVICE INSERTED AND POST 
DIGITAL MAMMOGRAPHIC AND ULTRASOUND IMAGIN2014

CLINICAL: Mass.  

PATIENT CONSENT: Oral and written informed consent was obtained. Risks, 
benefits, and alternatives were discussed with the patient. Risks include but 
are not limited to pain, infection, bleeding, incomplete procedure, repeat 
procedure, pneumothorax, damage to surrounding tissues, and allergic reaction.  
The patient understands the plan and wishes to proceed.  A time out was 
performed immediately prior to the procedure.  

Correlation is made to exams dated:  2014 ultrasound, 2014 mammogram
 - [facility], 10/17/2014 mammogram - [facility], 2012 mammogram, 2010 mammogram and 2008 mammogram - 
[facility].  

An ultrasound guided biopsy using real-time ultrasound was performed for the 
concerning 1 cm indistinct irregular shaped solid mass located in the right 
breast at 7 o'clock posterior depth 4 cm from the nipple.  This was described on
 the previous mammography and ultrasound reports.  The skin was prepped in the 
usual manner.  8 ccs of 1% lidocaine was administered during the procedure.  A 
skin nick was made in the breast.  The abnormality was approached from the 
lateral aspect.  A 12 gauge biopsy needle was placed adjacent to the abnormality
 under ultrasound guidance.  Once the needle was documented to be in the correct
 location, three cores were obtained using the vacuum assisted Octoshape EnCor 
Enspire device.  A Gel Az UltraCor S shaped clip was inserted into the biopsy 
cavity.  A skin adhesive and a sterile dressing were applied to the access site.
  Post procedure digital mammographic and ultrasound imaging demonstrates the 
clip at the targeted area and partial removal of the abnormality.  The specimens
 were sent to the laboratory for pathological analysis.  



IMPRESSION: ULTRASOUND GUIDED BIOPSY MALIGNANT 

Ultrasound guided biopsy of the 1 cm solid mass in the right breast at 7 o'clock
 posterior depth 4 cm from the nipple was successful with no apparent post 
procedure complications.  

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 
GRADE 1-2.

       - Negative for lymphovascular invasion.

       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
 receptor positive, progesterone receptor positive, HER2 negative'.  Pathology 
results are concordant with imaging findings.  

A surgical consult, a surgical excision and a chemo oncology consultation are 
recommended.  

A phone call was made to the physician at 1355 hrs 14.



Laila cage/:2014 14:42:00  

Imaging Technologist: Michele Monzon, [facility]

This exam was dictated and interpreted by [id number] for [facility] 
[place].

                            2014                            [facility]       

             

 

                          Breast US                            - BREAST US/R

ULTRASOUND OF RIGHT BREAST AND RIGHT AXILLA: 2014

CLINICAL: Mass.  

Comparison is made to exams dated:  2014 mammogram - [facility], 10/17/2014 mammogram - [facility], 2012 
mammogram, 2010 mammogram and 2008 mammogram - [facility].  

Color flow and real-time ultrasound of the right breast and axilla were 
performed.  Gray scale images of the real-time examination were reviewed.  

There is a 1 cm taller than wide irregular solid mass with an indistinct margin 
in the right breast at 7 o'clock posterior depth 4 cm from the nipple.  This 
irregular solid mass is hypoechoic with posterior acoustic shadowing.  This 
correlates with mammography findings.  Color flow imaging demonstrates that 
there is no vascularity present.  

No abnormalities were seen sonographically in the right axilla.  

IMPRESSION: HIGHLY SUGGESTIVE OF MALIGNANCY - FOLLOW-UP RECOMMENDED

The 1 cm taller than wide irregular solid mass in the right breast likely 
represents carcinoma and is highly suggestive of malignancy.  An ultrasound 
guided biopsy is recommended.  

A phone call was made to the physician's office and the results were reviewed 
with the patient.  

SUMMARY:

The patient scheduled her procedure prior to leaving the [facility].   Critical findings were called to the physician's 
nurse Kamini at 0856 hours on the day of the exam.  



Laila cage/maria de jesus:2014 08:56:15  

Imaging Technologist: Michele Monzon, [facility]

This exam was dictated and interpreted by [id number] for [facility].  

letter sent: Biopsy  

Ultrasound BI-RADS: 5 Highly suggestive of malignancy

                            2014                            [facility]       

             

 

                          Digital Mammo DX Uni MA                            - DIGITAL MAMMO DX UNI MA/R

UNILATERAL RIGHT DIGITAL DIAGNOSTIC MAMMOGRAM WITH CAD: 2014

CLINICAL: Mammographic Abnormality.  

Current study was evaluated with a Computer Aided Detection (CAD) system.  

Comparison is made to exams dated:  10/17/2014 mammogram - [facility], 2012 mammogram and 2010 mammogram - [facility].  

There are scattered fibroglandular densities in the right breast.  

There is a mass in the right breast at 6 o'clock posterior depth.  This is seen 
in additional views.  

No other significant masses or calcifications are seen in the breast.  

IMPRESSION: INCOMPLETE: NEEDS ADDITIONAL IMAGING EVALUATION

The mass in the right breast is indeterminate.  An ultrasound is recommended.  

The results were reviewed with the patient.  

SUMMARY:

Ultrasound will be performed at this time; please see dedicated separate report.
  



Laila maciast/penrad:2014 08:49:39  

Imaging Technologist: Miguelina Figueroa, [facility]

This exam was dictated and interpreted by [id number] for [facility].  

Mammogram BI-RADS: 0 Indeterminate

                            2014                            [facility]       

             

 

                          Digital Mammo Screening Alex MA                            - DIGITAL MAMMO SCREENING

 ALEX MA

BILATERAL DIGITAL SCREENING MAMMOGRAM WITH CAD: 10/17/2014

CLINICAL: Other Screening Mammogram.  

Current study was evaluated with a Computer Aided Detection (CAD) system.  

Comparison is made to exams dated:  2012 mammogram, 2010 mammogram and
 2008 [facility].  

There are scattered fibroglandular densities in both breasts.  

There are benign calcifications in the left breast.  

There is a mass in the right breast at 7 o'clock posterior depth.  

No other significant masses, calcifications, or other findings are seen in 
either breast.  

IMPRESSION: INCOMPLETE: NEEDS ADDITIONAL IMAGING EVALUATION

The mass in the right breast is indeterminate.  Spot compression and lateral 
views as well as an ultrasound are recommended.  



Laila cage/penrad:10/30/2014 15:10:05  

Imaging Technologist: Yanique ALANIZ)(MILLER), [facility]

This exam was dictated and interpreted by [id number] for [facility].  

letter sent: Additional Imaging  

Mammogram BI-RADS: 0 Indeterminate

                            10/17/2014                            [facility]

                    

 

                                        Spine cervical series DX                         

EXAM: Cervical spine.

HISTORY: Neck trauma

COMPARISON: 2007.

TECHNIQUE: 6 views of the cervical spine

 

FINDINGS: 

 

Straightening of the cervical spine may be due to cervical collar or muscle 
spasm. Otherwise, normal alignment of the cervical spine without acute traumatic
 injury seen. Anterior fusion C5-C7. Neuroforaminal stenosis on the right at 
C5-C6.

 

 

 

 

 

 

 

 

 

 

 

 

 

SL: 14

                            2014                            [facility]       

             



                                                                                
                                                                                
                                                                                
                                                                                
                                                                                
                                                                                
                    



Consultation Notes

                    





                                        No Data Provided for This Section                    



                                                            



Discharge Summaries

                    





                                        No Data Provided for This Section                    



                                                            



History and Physicals

                    





                                        No Data Provided for This Section                    



                                                                



Vital Signs

                     





                    Vital Sign                            Value                            Date         

                          Comments                            Source                    

 

                    Respitory Rate                            18                             09/15/2014 

                                                       [facility]                  

  

 

                    Heart Rate                            86                             09/15/2014     

                                                      [facility]                    

 

                    Temperature Oral (F)                            98.0 F                            09/15/2014

                                                        [facility]                 

   

 

                    Diastolic (mm Hg)                            79                             09/15/2014

                                                        [facility]                 

   

 

                    Systolic (mm Hg)                            138                             09/15/2014

                                                        [facility]                 

   

 

                    Height                            160.02 cm                            2014   

                                                      [facility]                    



 

                    Weight                            81.818                             2014     

                                                      [facility]                    

 

                    BMI Calculated                            31.95                             2014

                                                        [facility]                 

   

 

                    Systolic (mm Hg)                            158                             2014

                                                        [facility]                 

   

 

                    Temperature Oral (F)                            98.2 F                            2014

                                                        [facility]                 

   

 

                    Respitory Rate                            20                             2014 

                                                       [facility]                  

  

 

                    Heart Rate                            87                             2014     

                                                      [facility]                    

 

                    Diastolic (mm Hg)                            81                             2014

                                                        [facility]                 

   



                                                                                
                                                                                
                                                                                
                                        



Encounters

                    





                    Location                            Location Details                            Encounter

 Type                            Encounter Number                            Reason For

 Visit                            Attending Provider                            ADM Date

                            DC Date                            Status                

                                        Source                    

 

                          [facility]                            [id number]                 

                                                Lm Salcedo                             2014

                            09/15/2014                                               

                                        [facility] [place]                                               

                          Outpt Diag Services                            [phone]                 

                                                Michael Elaine                             10/17/2014

                            10/18/2014                                               

                                        [facility]                                               

                    Outpatient                            [id number]                            

                            Michael Elaine                             2014                                                    

                                        [facility]                                               

                    Outpatient                            [id number]                            

                            Michael Elaine                             2014                                                    

                                        [facility] [place]                                              

                          Outpt Diag Services                            [phone]                

                                                Anson Larios                             2014                                               

                                         [facility] [place]                    

 

                          [facility] [place]                                               

                          Outpt Diag Services                            [phone]                 

                                                Michael Elaine                             2014                                               

                                         [facility]                                               

                    Outpatient                            [id number]                            

                            Michael Elaine                             2015                                                    

                                        [facility] [place]                                               

                          Outpt Diag Services                            [id number]                 

                                                Michael Elaine                             10/06/2015

                            10/07/2015                                               

                                         [facility] [place]                    

 

                          [facility] [place]                                               

                          Outpt Diag Services                            [phone]                 

                                                Michael Elaine                             2016                                               

                                         [facility] [place]                    

 

                          [facility] [place]                                               

                          Outpt Diag Services                            [phone]                 

                                                Michael Elaine                             2016                                               

                                         [facility] [place]                    

 

                          [facility] [place]                                               

                          Outpt Diag Services                            [phone]                 

                                                Shukri Hyde                             2016                                               

                                         [facility] [place]                    

 

                          [facility] [place]                                               

                          Outpt Diag Services                            [phone]                 

                                                Shukri Hyde                             2016                                               

                                         [facility] [place]                    

 

                          [facility] [place]                                               

                          Outpt Diag Services                            [phone]                 

                                                Michael Ealine                             2016                                               

                                         [facility] [place]                    

 

                          [facility]                                               

                    Outpatient                            [id number]                            

                            Michael Elaine                             2016     

                          11/15/2016                                                    

                                        [facility]                    

 

                    [facility]                                                        OP Therapy

 Patients                            [id number]                                    

                          [facility]                             2017                                                         [facility] 

[facility]                    

 

                    [facility]                                                        OP Therapy

 Patients                            [id number]                                    

                          [facility]                             2017                                                         [facility] 

[facility]                    

 

                    [facility]                                                        OP Therapy

 Patients                            [id number]                                    

                          [facility]                             2017                                                         [facility] 

[facility]                    

 

                          [facility] [place]                                               

                          Outpt Diag Services                            [phone]                 

                                                Michael Elaine                             2017                                               

                                         [facility] [place]                    

 

                    [facility]                                                        OP Therapy

 Patients                            [id number]                                    

                          Windy Lopez                             2017                                                         [facility]

 [facility]                    

 

                    [facility]                                                        OP Therapy

 Patients                            [id number]                                    

                          Windy Lopez                             2017                                                         [facility]

 [facility]                    

 

                          [facility] [place]                                               

                          Outpt Diag Services                            [phone]                 

                                                Michael Elaine                             2018                                               

                                         [facility] [place]                    

 

                          [facility] [place]                                               

                          Outpt Diag Services                            [phone]                 

                                                Michael Elaine                             2018                                               

                                         [facility] [place]                    

 

                          [facility] [place]                                               

                          Outpt Diag Services                            [phone]                 

                                                Michael Elaine                             2018                                               

                                         [facility] [place]                    

 

                          [facility] [place]                                               

                          Outpt Diag Services                            [phone]                 

                                                [facility]                             10/11/2018

                            10/12/2018                                               

                                         [facility] [place]                    

 

                    [facility]                                                        OP Therapy

 Patients                            [id number]                                    

                          [facility]                             2019                                                         [facility] 

[facility]                    

 

                    [facility]                                                        OP Therapy

 Patients                            [id number]                                    

                          Phil Casas                             2019                                                        [facility] 

[facility]                    



                                                                                
                                                                                
                                                                                
                                                                                
                                                    



Procedures

                    





                    Procedure                            Code                            Date           

                          Perfomer                            Comments                        

                                        Source                    

 

                          Abdominal hysterectomy                            [id number]                       

                                                                                                    

[facility]                    

 

                    Appendectomy                            [id number]                                    

                                                                            [facility]

                    

 

                          Cataract surgery<sup>1</sup>                            [id number]                 

                                                                            bilat               

                                        [facility]                    

 

                    Cholecystectomy                            [id number]                                 

                                                                               [facility]

                    

 

                          Kidney biopsy<sup>2</sup>                            [id number]                      

                                                                            removal of kidney stones 

                                        [facility]                    

 

                                        Replacement of electronic stimulator into bladder<sup>3</sup>                   

                    [id number]                                                                      

                          Placed in left hip                            [facility]             

       

 

                          Suspension of bladder<sup>4</sup>                            [id number]              

                                                                            x3               

                                        [facility]                    

 

                    Tonsillectomy                            [id number]                                  

                                                                              [facility]

                    

 

                          Abdominal hysterectomy                            [id number]                       

                                                                                                    

 [facility] [place]                    

 

                    Appendectomy                            [id number]                                    

                                                                             [facility] [place]

                    

 

                          Cataract surgery<sup>1</sup>                            [id number]                 

                                                                            bilat               

                                         [facility] [place]                    

 

                    Cholecystectomy                            [id number]                                 

                                                                                [facility] 

[place]                    

 

                          Kidney biopsy<sup>2</sup>                            [id number]                      

                                                                            removal of kidney stones 

                                         [facility] [place]                    

 

                                        Replacement of electronic stimulator into bladder<sup>3</sup>                   

                    [id number]                                                                      

                          Placed in left hip                             [facility] [place]      

              

 

                          Suspension of bladder<sup>4</sup>                            3942005              

                                                                            x3               

                                         [facility] [place]                    

 

                    Tonsillectomy                            [id number]                                  

                                                                               [facility] [place]

                    

 

                          Abdominal hysterectomy                            [id number]                       

                                                                                                    

[facility]                    

 

                    Appendectomy                            [id number]                                    

                                                                            [facility]

 [facility]                    

 

                          Cataract surgery<sup>1</sup>                            [id number]                 

                                                                            bilat               

                                        [facility]                    

 

                    Cholecystectomy                            [id number]                                 

                                                                               [facility]

 [facility]                    

 

                          Kidney biopsy<sup>2</sup>                            [id number]                      

                                                                            removal of kidney stones 

                                        [facility]                    

 

                                        Replacement of electronic stimulator into bladder<sup>3</sup>                   

                    [id number]                                                                      

                          Placed in left hip                            [facility]    

                

 

                          Suspension of bladder<sup>4</sup>                            3942005              

                                                                            x3               

                                        [facility]                    

 

                    Tonsillectomy                            [id number]                                  

                                                                              [facility]

 [facility]                    



                                                                                
                                                                                
                                                                                
                                                                                
                                                                                
                            



Assessment and Plan

                    





                                        No Data Provided for This Section                    



                                     



Plan of Care







                                        No Data Provided for This Section                    



                                                                



Social History

                    





                    Social History                            Date                            Source    

                

 

                          No data available for this section                            2019          

                                        [facility]                    

 

                          No data available for this section                            10/12/2018          

                                         [facility] [place]                    

 

                          No data available for this section                            11/15/2016          

                                        [facility]                    



                                                                                
                    



Family History

                    





                                        No Data Provided for This Section                    



                                                            



Advance Directives

                    





                                        No Data Provided for This Section                    



                                                            



Functional Status

                    





                                        No Data Provided for This Section

## 2019-08-02 NOTE — XMS REPORT
Patient Summary Document

                             Created on: 2019



VERONICA LEAVITT

External Reference #: [id number]

: 1940

Sex: Female



Demographics







                          Address                   [address] [id number]

 

                          Home Phone                [phone]

 

                          Preferred Language        Unknown

 

                          Marital Status            Unknown

 

                          Sikhism Affiliation     Unknown

 

                          Race                      Unknown

 

                                        Additional Race(s)  

 

                          Ethnic Group              Unknown





Author







                          Author                    [facility]

 

                          Address                   Unknown

 

                          Phone                     Unavailable







Support







                Name            Relationship    Address         Phone

 

                    IRAM DAVILA      PRS                 [address], [place]  [address] [phone]

 

                    ESTEFANI LEAVITT      PRS                 [address], [place]  [address] [phone]

 

                    ESTEFANI LEAVITT      PRS                 [address] [place] [address]  [id number] [phone]







Care Team Providers







                    Care Team Member Name    Role                Phone

 

                    MACIE MORRIS        Unavailable         Unavailable







Payers







             Payer Name    Policy Type    Policy Number    Effective Date    Expiration Date







Problems

This patient has no known problems.



Allergies, Adverse Reactions, Alerts







          Allergy Name    Allergy Type    Status    Severity    Reaction(s)    Onset Date    Inactive 

Date                      Treating Clinician        Comments

 

        ciprofloxacin    DA      Active    SV              2019 00:00:00                     

 

        pregabalin    DA      Active    MO              2019 00:00:00                     

 

        ciprofloxacin    DA      Active    SV              2018 00:00:00                     

 

        pregabalin    DA      Active    U               2018 00:00:00                     

 

        TAPE    DA      Active    MO              2018 00:00:00                     

 

        ciprofloxacin    DA      Active    U               2018 00:00:00                     

 

        ciprofloxacin    DA      Active    SV              2018-10-27 00:00:00                     

 

        ciprofloxacin    DA      Active    U               2006 00:00:00                     







Medications

This patient has no known medications.



Results







           Test Description    Test Time    Test Comments    Text Results    Atomic Results    Result

 Comments

 

                CHEST 2 VIEWS    2019 13:00:00                                                             

                                             [place] [address]  
   Patient Name: VERONICA LEAVITT                                   MR #: [id number]
                    : 1940                                   Age/Sex: 
78/F  Acct #: [id number]                              Req #: [id number]  Adm 
Physician:                                                      Ordered by: 
MACIE MORRIS MD                            Report #: [id number]        Location: 
OR                                      Room/Bed:                     
___________________________________________________________________________________________________
   Procedure: [id number] DX/CHEST 2 VIEWS  Exam Date:                            
Exam Time:                                               REPORT STATUS: Signed  
 EXAMINATION:  CHEST 2 VIEWS          INDICATION: Pre-operative      COMPARISON:
Chest radiograph of 10/9/2018           FINDINGS:      LINES/TUBES:None      
LUNGS:The lungs are well-inflated. No focal consolidation or pulmonary edema.   
Scattered bibasilar calcified granulomas.      PLEURA:No pleural effusion or 
pneumothorax.      MEDIASTINUM:The cardiomediastinal silhouette appears normal 
in size and shape.   Atherosclerotic calcifications of the thoracic aorta.      
BONES/SOFT TISSUES:No acute osseous injury. Mild degenerative changes of the   
thoracic spine. Postoperative changes of hemiarthroplasty of both shoulders.   
Anterior cervical spine fusion hardware present.      ABDOMEN:No free air under 
the diaphragm. Status post cholecystectomy.      IMPRESSION:    No focal 
pneumonia or pulmonary edema.      Signed by: Renetta Main MD on 2019 1:03 
PM        Dictated By: RENETTA MAIN MD  Electronically Signed By: RENETTA MAIN MD on
19 1303  Transcribed By: BETH on 19 1303       COPY TO:   
MACIE MORRIS MD                            

 

                BASIC METABOLIC PANEL    2019 07:03:00                      

 

   

 

                SODIUM (test code=NA)    143 mEq/L       134-147          

 

                POTASSIUM (test code=K)    4.0 mEq/L       3.4-5.0          

 

                CHLORIDE (test code=CL)    110 mEq/L       100-108          

 

                CARBON DIOXIDE (test code=CO2)    30 mEq/L        21-33            

 

                ANION GAP (test code=GAP)    7               0-20             

 

                GLUCOSE (test code=GLU)    92 mg/dL                   

 

                BLOOD UREA NITROGEN (test code=BUN)    23 mg/dL        7-18             

 

                GLOMERULAR FILTRATION RATE (test code=GFR)    60.9            70-80           Units of measure=ml/min/1.73

 m2

 

                CREATININE (test code=CREAT)    0.9 mg/dL       0.6-1.3          

 

                CALCIUM (test code=CA)    8.7 mg/dL       8.0-10.5         





URINALYSIS [id number] 17:15:00* 





                Test Item       Value           Reference Range    Comments

 

                UA COLOR (test code=COLU)    YELLOW          YEL/STRAW        

 

                UA APPEARANCE (test code=APPU)    CLEAR           CLEAR            

 

                UA GLUCOSE DIPSTICK (test code=DGLUU)    NEGATIVE        NEGATIVE         

 

                UA BILIRUBIN DIPSTICK (test code=BILU)    NEGATIVE        NEGATIVE         

 

                UA KETONE DIPSTICK (test code=KETU)    TRACE           NEGATIVE         

 

                UA SPECIFIC GRAVITY (test code=SGU)    1.004           1.005-1.030      

 

                UA BLOOD DIPSTICK (test code=LUIS ANGEL)    NEGATIVE        NEGATIVE         

 

                UA PH DIPSTICK (test code=JOSEPH)    6.0             5.0-7.0          

 

                UA PROTEIN DIPSTICK (test code=PROU)    NEGATIVE        NEGATIVE         

 

                UA UROBILINIOGEN DIPSTICK (test code=URO)    0.2 mg/dL       0.2-1.0          

 

                UA NITRITE DIPSTICK (test code=CLARIBEL)    NEGATIVE        NEGATIVE         

 

                UA LEUKOCYTE ESTERASE DIPSTICK (test code=LEUU)    NEGATIVE        NEGATIVE         

 

                UA WBC (test code=WBCU)    0-3 WBC/HPF     0-3              

 

                UA RBC (test code=RBCU)    0-3 RBC/HPF     0-3              

 

                UA BACTERIA (test code=BACU)    NONE SEEN /HPF    NONE SEEN        

 

                UA SQUAMOUS CELLS (test code=SQU)    0-5 /HPF        NONE SEEN        





CBC W/AUTO [id number] 15:51:00* 





                Test Item       Value           Reference Range    Comments

 

                WHITE BLOOD CELL (test code=WBC)    6.47 x10 3/uL    4.5-11.0         

 

                RED BLOOD CELL (test code=RBC)    4.18 x10 6/uL    3.54-5.02        

 

                HEMOGLOBIN (test code=HGB)    12.4 g/dL       11.0-15.0        

 

                HEMATOCRIT (test code=HCT)    40.8 %          33.0-45.0        

 

                MEAN CELL VOLUME (test code=MCV)    97.6 fL         81.0-99.0        

 

                MEAN CELL HGB (test code=MCH)    29.7 pg         27.0-33.0        

 

                MEAN CELL HGB CONCETRATION (test code=MCHC)    30.4 g/dL       33.0-37.0        

 

                RED CELL DISTRIBUTION WIDTH CV (test code=RDW)    13.4 %          11.5-14.5        

 

                RED CELL DISTRIBUTION WIDTH SD (test code=RDW-SD)    47.5 fL         37.0-54.0        

 

                PLATELET COUNT (test code=PLT)    172 x10 3/uL    150-400          

 

                MEAN PLATELET VOLUME (test code=MPV)    11.0 fL         7.0-9.0          

 

                NEUTROPHIL % (test code=NT%)    58.6 %          56.0-77.0        

 

                IMMATURE GRANULOCYTE % (test code=IG%)    0.3 %           0.0-2.0          

 

                LYMPHOCYTE % (test code=LY%)    28.3 %          14.0-32.0        

 

                MONOCYTE % (test code=MO%)    9.1 %           4.8-9.0          

 

                EOSINOPHIL % (test code=EO%)    2.6 %           0.3-3.7          

 

                BASOPHIL % (test code=BA%)    1.1 %           0.0-2.0          

 

                NUCLEATED RBC % (test code=NRBC%)    0.0 %           0-0              

 

                NEUTROPHIL # (test code=NT#)    3.79 x10 3/uL    2.0-7.6          

 

                IMMATURE GRANULOCYTE # (test code=IG#)    0.02 x10 3/uL    0.00-0.03        

 

                LYMPHOCYTE # (test code=LY#)    1.83 x10 3/uL    1.0-3.8          

 

                MONOCYTE # (test code=MO#)    0.59 x10 3/uL    0.1-0.8          

 

                EOSINOPHIL # (test code=EO#)    0.17 x10 3/uL    0.0-0.2          

 

                BASOPHIL # (test code=BA#)    0.07 x10 3/uL    0.0-0.2          

 

                NUCLEATED RBC # (test code=NRBC#)    0.00 x10 3/uL    0.0-0.1          

 

                MANUAL DIFF REQUIRED (test code=MDIFF)    NO                               





BASIC METABOLIC [id number] 15:50:00* 





                Test Item       Value           Reference Range    Comments

 

                SODIUM (test code=NA)    134 mEq/L       134-147          

 

                POTASSIUM (test code=K)    3.6 mEq/L       3.4-5.0          

 

                CHLORIDE (test code=CL)    100 mEq/L       100-108          

 

                CARBON DIOXIDE (test code=CO2)    28 mEq/L        21-33            

 

                ANION GAP (test code=GAP)    10              0-20             

 

                GLUCOSE (test code=GLU)    92 mg/dL                   

 

                BLOOD UREA NITROGEN (test code=BUN)    27 mg/dL        7-18             

 

                GLOMERULAR FILTRATION RATE (test code=GFR)    43.7            70-80           Units of measure=ml/min/1.73

 m2

 

                CREATININE (test code=CREAT)    1.2 mg/dL       0.6-1.3          

 

                CALCIUM (test code=CA)    8.6 mg/dL       8.0-10.5         





- XR CHEST 2 -75-79 14:48:00 FAX: Phil Schwartz MD   [phone]
   [place]:   St: PRE FAX:         Ivet Dickerson  N                
------------------------------------------------------------------------------- 
Name:   VERONICA LEAVITT              [facility]             :
1940  Age/S: 78/F           [address] [place] [address]         Unit #: 
[id number]      Loc: [place] [address]                 Phys: 
Phil Casas MD                                                  Acct: 
[id number] Dis Date:               Status: PRE SDC                            
   PHONE #: [phone]     Exam Date:     2019     1417                 
 FAX #: [phone]     Reason: LEFT SHOULDER PRIMARY OSTEOARTHRITIS           
   EXAMS:                                               CPT CODE:      [id number]
XR CHEST 2 V                               02785                    CLINICAL 
HISTORY: Left shoulder primary osteoarthritis.               COMPARISON: 
2018..               PA and lateral films of the chest demonstrate 
that heart size is       normal.  Lung fields are clear.  No evidence of 
pneumonia or       congestive failure is seen.               Postsurgical 
changes in right shoulder are present.  Surgical clip in       the right axilla 
are also noted.  There is also evidence of previous       fusion surgery in 
cervical spine.                 IMPRESSION: No evidence of pneumonia or congest
mavis failure.          ** Electronically Signed by RAMOS Keller on 2019
at 6399 **                      Reported and signed by: Enrico Keller M.D.       
                  CC: Phil Casas MD; Ivet Dickerson NP                   
                                                                             
Technologist: Denisse Menendez, RT(R), RTT                             Trnscrd August
e/Time/By: 2019 (6758) : By: Loreta           Orig Print D/T: S: 2019 (9138)                         PAGE  1                       Signed Report 
                             CHEST 2 VIEWS2018-10-09 16:13:00    [address] [place] [address]      Patient Name: VERONICA LEAVITT   MR #: [id number]    : 1940 
Age/Sex: 78/F  Acct #: [id number] Req #: [id number]  Adm Physician:     
Ordered by: MACIE MORRIS MD  Report #: [id number]   Location: OR  Room/Bed:     
________________________________________________________________________________
___________________    Procedure: [id number] DX/CHEST 2 VIEWS  Exam Date:        
                    Exam Time:        REPORT STATUS: Signed    EXAMINATION:  CH
EST 2 VIEWS          INDICATION:                  COMPARISON:  None           FI
NDINGS:  PA and lateral views      TUBES and LINES:  None.      LUNGS:  Lungs ar
e well inflated.  Lungs are clear.   There is no evidence of   pneumonia or pulm
onary edema.      PLEURA:  No pleural effusion or pneumothorax.      HEART AND M
EDIASTINUM:  The cardiomediastinal silhouette is unremarkable. Mild   atheroscle
rotic calcifications of the aortic arch.      BONES AND SOFT TISSUES:  Cervical 
spine fusion. Metallic hardware overlying the   right humeral head. Mild degener
ative changes of the thoracic spine.  Surgical   clips overlying the upper abdom
en on lateral view.      UPPER ABDOMEN: No free air under the diaphragm.       I
MPRESSION:    No acute thoracic abnormality.         Signed by: Dr. Celine Campuzano M.D. on 10/9/2018 4:15 PM        Dictated By: CELINE WRIGHT MD  Electronically Signed By: CELINE CAMPUZANO MD on 10/09/18 3005  Tr
anscribed By: BETH on 10/09/18 6381       COPY TO:   MACIE MORRIS MD

## 2019-09-27 NOTE — OPERATIVE REPORT
DATE OF PROCEDURE:  08/02/2019

 

SURGEON:  Anson Larios MD

 

PREOPERATIVE DIAGNOSES:  

1. Urinary tract infections.

2. Refractory urge incontinence.

 

POSTOPERATIVE DIAGNOSES:  

1. Urinary tract infections.

2. Refractory urge incontinence.

3. Left hydronephrosis.

4. Grade 2 cystocele.

5. Grade 1 rectocele.

6. Atrophic (senile) vaginitis.

 

OPERATIONS PERFORMED:  

1. Cystourethroscopy with bilateral ureteral catheterization and retrograde

ureteropyelography (separately performed for the urinary tract infections). 

2. Interpretation of retrograde ureteropyelography.

3. Supervision of fluoroscopy, no radiologist present.

4. Right ureteroscopy (separately performed for the hydronephrosis).

5. Radiological services with supervision and interpretation of ureteroscopy.

6. Cystourethroscopy with intravesical injection of Botox (separate procedure performed

for the refractory urge incontinence). 

7. Pelvic examination under anesthesia.

 

ANESTHESIA:  General.

 

COMPLICATIONS:  None.

 

CLINICAL SUMMARY:  Chantel Mcginnis is a 78-year-old woman with a complicated urological

history.  She has a history of right nephrolithotomy in the past.  She has chronic

right-sided hydronephrosis.  She has a history of InterStim placement for her refractory

urge incontinence in the past.  The patient is brought to the operating room for the

above procedure.  She is aware of the risks of bleeding, infection, injury to adjacent

structures, need for additional procedures, and elected to proceed. 

 

OPERATIVE PROCEDURE IN DETAIL:  Informed consent was verified.  Chantel Mcginnis was

properly identified, taken to the operating room, placed on the cystoscopy table in

supine position.  Anesthesia was uneventfully begun.  The patient was then carefully and

gently repositioned in dorsal lithotomy position with all pressure points were well

padded.  Her genitalia were prepared and draped in usual sterile fashion.  The

cystoscope sheath with an obturator in place was atraumatically inserted into the

patient's urethra and bladder was drained.  Panendoscopy of the urinary bladder revealed

no suspicious mucosal lesions, no tumors, no stones, and no diverticula.  Normally

positioned and configured ureteral orifices were identified.  A ureteral catheter was

used to cannulate each ureter and retrograde ureteropyelograms were performed. 

 

A guide was then placed into the right ureter and guided to the level of the patient's

kidney.  The flexible ureteroscope was then brought up over the guidewire and brought up

to the level of the patient's kidney.  Panendoscopy revealed intrarenal collecting

system revealed no suspicious mucosal lesions, no tumors, no stones, and no diverticula.

 We carefully examined the ureter as we exited.  There was significant tortuosity of the

ureter with this ureter did not contain any filling defect nor any suspicious lesions. 

 

Interpretation of retrograde ureteropyelography contrast was instilled in retrograde

fashion bilaterally.  The left side was relatively unremarkable.  There were no tumors.

There were no stones.  There were no diverticula.  Unobstructed drainage was observed.

The right hand side exhibited chronic-appearing hydronephrosis with tortuosity of the

proximal portion of the ureter. 

 

Unobstructed drainage was observed fluoroscopically, although this may be somewhat

retarded.  There was a hardware noted fixating the patient's spine as well as the

contacts from the patient's previous InterStim, which was removed. 

 

Botox was reconstituted and dissolved and sterile saline was then injected 1 mL aliquots

in an even distribution throughout the supratrigonal bladder. 

 

The patient's bladder was drained and cystoscope was withdrawn.  Pelvic examination

under anesthesia revealed grade 2 cystocele, grade 1 cystocele.  There was atrophic

(senile) vaginitis.  No abnormal palpable pelvic masses could be appreciated.  There

were no obvious mucosal lesions.  The patient did have a very foreshortened vagina and

may have some degree of vaginal cuff prolapse, possibly a grade 2. 

 

The patient was then uneventfully reversed from anesthesia and taken to recovery room in

stable condition.  There were no complications to the procedure.  She tolerated the

procedure well.  Explicit postop instructions were given.  We will follow the patient up

in the office. 

 

Specimen obtained from the case were a culture and sensitivity, aspirated directly from

the right renal pelvis. 

 

 

 

 

______________________________

Anson MD Ric

 

OH/MODL

D:  09/26/2019 21:53:39

T:  09/27/2019 05:28:59

Job #:  [id number]

## 2020-10-09 LAB
ANION GAP SERPL CALC-SCNC: 14.4 MMOL/L (ref 8–16)
BASOPHILS # BLD AUTO: 0.1 10*3/UL (ref 0–0.1)
BASOPHILS NFR BLD AUTO: 0.6 % (ref 0–1)
BUN SERPL-MCNC: 22 MG/DL (ref 7–26)
BUN/CREAT SERPL: 23 (ref 6–25)
CALCIUM SERPL-MCNC: 9.5 MG/DL (ref 8.4–10.2)
CHLORIDE SERPL-SCNC: 104 MMOL/L (ref 98–107)
CO2 SERPL-SCNC: 27 MMOL/L (ref 22–29)
DEPRECATED NEUTROPHILS # BLD AUTO: 5.6 10*3/UL (ref 2.1–6.9)
EGFRCR SERPLBLD CKD-EPI 2021: 56 ML/MIN (ref 60–?)
EOSINOPHIL # BLD AUTO: 0.1 10*3/UL (ref 0–0.4)
EOSINOPHIL NFR BLD AUTO: 1.1 % (ref 0–6)
ERYTHROCYTE [DISTWIDTH] IN CORD BLOOD: 15.3 % (ref 11.7–14.4)
GLUCOSE SERPLBLD-MCNC: 94 MG/DL (ref 74–118)
HCT VFR BLD AUTO: 41.5 % (ref 34.2–44.1)
HGB BLD-MCNC: 13 G/DL (ref 12–16)
LYMPHOCYTES # BLD: 1.8 10*3/UL (ref 1–3.2)
LYMPHOCYTES NFR BLD AUTO: 21.7 % (ref 18–39.1)
MCH RBC QN AUTO: 30.4 PG (ref 28–32)
MCHC RBC AUTO-ENTMCNC: 31.3 G/DL (ref 31–35)
MCV RBC AUTO: 97.2 FL (ref 81–99)
MONOCYTES # BLD AUTO: 0.7 10*3/UL (ref 0.2–0.8)
MONOCYTES NFR BLD AUTO: 8 % (ref 4.4–11.3)
NEUTS SEG NFR BLD AUTO: 68.2 % (ref 38.7–80)
PLATELET # BLD AUTO: 178 X10E3/UL (ref 140–360)
POTASSIUM SERPL-SCNC: 4.4 MMOL/L (ref 3.5–5.1)
RBC # BLD AUTO: 4.27 X10E6/UL (ref 3.6–5.1)
SODIUM SERPL-SCNC: 141 MMOL/L (ref 136–145)

## 2020-10-14 ENCOUNTER — HOSPITAL ENCOUNTER (OUTPATIENT)
Dept: HOSPITAL 88 - OR | Age: 80
Discharge: HOME | End: 2020-10-14
Attending: UROLOGY
Payer: MEDICARE

## 2020-10-14 VITALS — DIASTOLIC BLOOD PRESSURE: 58 MMHG | SYSTOLIC BLOOD PRESSURE: 113 MMHG

## 2020-10-14 DIAGNOSIS — N31.9: ICD-10-CM

## 2020-10-14 DIAGNOSIS — Z91.048: ICD-10-CM

## 2020-10-14 DIAGNOSIS — Z84.1: ICD-10-CM

## 2020-10-14 DIAGNOSIS — J45.909: ICD-10-CM

## 2020-10-14 DIAGNOSIS — N28.1: ICD-10-CM

## 2020-10-14 DIAGNOSIS — N39.46: Primary | ICD-10-CM

## 2020-10-14 DIAGNOSIS — N95.2: ICD-10-CM

## 2020-10-14 DIAGNOSIS — N81.89: ICD-10-CM

## 2020-10-14 DIAGNOSIS — Z88.2: ICD-10-CM

## 2020-10-14 DIAGNOSIS — N13.30: ICD-10-CM

## 2020-10-14 DIAGNOSIS — Z11.59: ICD-10-CM

## 2020-10-14 DIAGNOSIS — R35.1: ICD-10-CM

## 2020-10-14 DIAGNOSIS — Z01.812: ICD-10-CM

## 2020-10-14 DIAGNOSIS — Z85.3: ICD-10-CM

## 2020-10-14 DIAGNOSIS — Z01.818: ICD-10-CM

## 2020-10-14 DIAGNOSIS — Z96.82: ICD-10-CM

## 2020-10-14 DIAGNOSIS — N18.2: ICD-10-CM

## 2020-10-14 DIAGNOSIS — Z79.82: ICD-10-CM

## 2020-10-14 DIAGNOSIS — N81.6: ICD-10-CM

## 2020-10-14 DIAGNOSIS — G47.33: ICD-10-CM

## 2020-10-14 DIAGNOSIS — N81.10: ICD-10-CM

## 2020-10-14 DIAGNOSIS — Z88.8: ICD-10-CM

## 2020-10-14 DIAGNOSIS — Z01.810: ICD-10-CM

## 2020-10-14 DIAGNOSIS — N20.0: ICD-10-CM

## 2020-10-14 DIAGNOSIS — I12.9: ICD-10-CM

## 2020-10-14 DIAGNOSIS — D41.01: ICD-10-CM

## 2020-10-14 DIAGNOSIS — Z88.1: ICD-10-CM

## 2020-10-14 DIAGNOSIS — N30.10: ICD-10-CM

## 2020-10-14 PROCEDURE — 85025 COMPLETE CBC W/AUTO DIFF WBC: CPT

## 2020-10-14 PROCEDURE — 36415 COLL VENOUS BLD VENIPUNCTURE: CPT

## 2020-10-14 PROCEDURE — 71046 X-RAY EXAM CHEST 2 VIEWS: CPT

## 2020-10-14 PROCEDURE — 74420 UROGRAPHY RTRGR +-KUB: CPT

## 2020-10-14 PROCEDURE — 80048 BASIC METABOLIC PNL TOTAL CA: CPT

## 2020-10-14 PROCEDURE — 52351 CYSTOURETERO & OR PYELOSCOPE: CPT

## 2020-10-14 PROCEDURE — 93005 ELECTROCARDIOGRAM TRACING: CPT

## 2020-10-14 PROCEDURE — 52287 CYSTOSCOPY CHEMODENERVATION: CPT

## 2020-12-08 ENCOUNTER — HOSPITAL ENCOUNTER (OUTPATIENT)
Dept: HOSPITAL 88 - NM | Age: 80
End: 2020-12-08
Attending: UROLOGY
Payer: MEDICARE

## 2020-12-08 DIAGNOSIS — N13.30: Primary | ICD-10-CM

## 2020-12-08 PROCEDURE — 78708 K FLOW/FUNCT IMAGE W/DRUG: CPT

## 2021-03-12 LAB
ANION GAP SERPL CALC-SCNC: 15.2 MMOL/L (ref 8–16)
BASOPHILS # BLD AUTO: 0.1 10*3/UL (ref 0–0.1)
BASOPHILS NFR BLD AUTO: 0.9 % (ref 0–1)
BUN SERPL-MCNC: 27 MG/DL (ref 7–26)
BUN/CREAT SERPL: 29 (ref 6–25)
CALCIUM SERPL-MCNC: 8.8 MG/DL (ref 8.4–10.2)
CHLORIDE SERPL-SCNC: 106 MMOL/L (ref 98–107)
CO2 SERPL-SCNC: 26 MMOL/L (ref 22–29)
DEPRECATED NEUTROPHILS # BLD AUTO: 4.7 10*3/UL (ref 2.1–6.9)
EGFRCR SERPLBLD CKD-EPI 2021: 59 ML/MIN (ref 60–?)
EOSINOPHIL # BLD AUTO: 0.1 10*3/UL (ref 0–0.4)
EOSINOPHIL NFR BLD AUTO: 1.2 % (ref 0–6)
ERYTHROCYTE [DISTWIDTH] IN CORD BLOOD: 15 % (ref 11.7–14.4)
GLUCOSE SERPLBLD-MCNC: 84 MG/DL (ref 74–118)
HCT VFR BLD AUTO: 38.9 % (ref 34.2–44.1)
HGB BLD-MCNC: 12.3 G/DL (ref 12–16)
LYMPHOCYTES # BLD: 1.3 10*3/UL (ref 1–3.2)
LYMPHOCYTES NFR BLD AUTO: 19.4 % (ref 18–39.1)
MCH RBC QN AUTO: 30.6 PG (ref 28–32)
MCHC RBC AUTO-ENTMCNC: 31.6 G/DL (ref 31–35)
MCV RBC AUTO: 96.8 FL (ref 81–99)
MONOCYTES # BLD AUTO: 0.6 10*3/UL (ref 0.2–0.8)
MONOCYTES NFR BLD AUTO: 8.9 % (ref 4.4–11.3)
NEUTS SEG NFR BLD AUTO: 69.2 % (ref 38.7–80)
PLATELET # BLD AUTO: 164 X10E3/UL (ref 140–360)
POTASSIUM SERPL-SCNC: 4.2 MMOL/L (ref 3.5–5.1)
RBC # BLD AUTO: 4.02 X10E6/UL (ref 3.6–5.1)
SODIUM SERPL-SCNC: 143 MMOL/L (ref 136–145)

## 2021-03-15 ENCOUNTER — HOSPITAL ENCOUNTER (OUTPATIENT)
Dept: HOSPITAL 88 - OR | Age: 81
Discharge: HOME | End: 2021-03-15
Attending: UROLOGY
Payer: MEDICARE

## 2021-03-15 VITALS — SYSTOLIC BLOOD PRESSURE: 126 MMHG | DIASTOLIC BLOOD PRESSURE: 67 MMHG

## 2021-03-15 DIAGNOSIS — E78.5: ICD-10-CM

## 2021-03-15 DIAGNOSIS — I10: ICD-10-CM

## 2021-03-15 DIAGNOSIS — N39.0: ICD-10-CM

## 2021-03-15 DIAGNOSIS — N95.2: ICD-10-CM

## 2021-03-15 DIAGNOSIS — Z88.2: ICD-10-CM

## 2021-03-15 DIAGNOSIS — N36.2: ICD-10-CM

## 2021-03-15 DIAGNOSIS — Z01.810: ICD-10-CM

## 2021-03-15 DIAGNOSIS — Z20.822: ICD-10-CM

## 2021-03-15 DIAGNOSIS — Z88.1: ICD-10-CM

## 2021-03-15 DIAGNOSIS — Z91.048: ICD-10-CM

## 2021-03-15 DIAGNOSIS — Z88.8: ICD-10-CM

## 2021-03-15 DIAGNOSIS — N13.30: ICD-10-CM

## 2021-03-15 DIAGNOSIS — N13.8: ICD-10-CM

## 2021-03-15 DIAGNOSIS — Z01.812: ICD-10-CM

## 2021-03-15 DIAGNOSIS — N81.2: ICD-10-CM

## 2021-03-15 DIAGNOSIS — M19.90: ICD-10-CM

## 2021-03-15 DIAGNOSIS — N20.0: ICD-10-CM

## 2021-03-15 DIAGNOSIS — N39.41: Primary | ICD-10-CM

## 2021-03-15 DIAGNOSIS — Z85.3: ICD-10-CM

## 2021-03-15 DIAGNOSIS — N81.6: ICD-10-CM

## 2021-03-15 DIAGNOSIS — N32.89: ICD-10-CM

## 2021-03-15 PROCEDURE — 93005 ELECTROCARDIOGRAM TRACING: CPT

## 2021-03-15 PROCEDURE — 52005 CYSTO W/URTRL CATHJ: CPT

## 2021-03-15 PROCEDURE — 85025 COMPLETE CBC W/AUTO DIFF WBC: CPT

## 2021-03-15 PROCEDURE — 80048 BASIC METABOLIC PNL TOTAL CA: CPT

## 2021-03-15 PROCEDURE — 36415 COLL VENOUS BLD VENIPUNCTURE: CPT

## 2021-03-15 PROCEDURE — 74420 UROGRAPHY RTRGR +-KUB: CPT

## 2021-03-15 PROCEDURE — 52287 CYSTOSCOPY CHEMODENERVATION: CPT
